# Patient Record
Sex: FEMALE | Race: WHITE | Employment: OTHER | URBAN - METROPOLITAN AREA
[De-identification: names, ages, dates, MRNs, and addresses within clinical notes are randomized per-mention and may not be internally consistent; named-entity substitution may affect disease eponyms.]

---

## 2017-03-10 ENCOUNTER — TELEPHONE (OUTPATIENT)
Dept: CARDIOLOGY CLINIC | Age: 67
End: 2017-03-10

## 2017-05-11 ENCOUNTER — OFFICE VISIT (OUTPATIENT)
Dept: CARDIOLOGY CLINIC | Age: 67
End: 2017-05-11

## 2017-05-11 VITALS
WEIGHT: 165 LBS | HEIGHT: 61 IN | BODY MASS INDEX: 31.15 KG/M2 | SYSTOLIC BLOOD PRESSURE: 116 MMHG | DIASTOLIC BLOOD PRESSURE: 82 MMHG | HEART RATE: 59 BPM

## 2017-05-11 DIAGNOSIS — I48.0 PAF (PAROXYSMAL ATRIAL FIBRILLATION) (HCC): Primary | ICD-10-CM

## 2017-05-11 DIAGNOSIS — I48.0 PAROXYSMAL ATRIAL FIBRILLATION (HCC): Primary | ICD-10-CM

## 2017-05-11 DIAGNOSIS — R00.2 PALPITATION: ICD-10-CM

## 2017-05-11 PROCEDURE — 99214 OFFICE O/P EST MOD 30 MIN: CPT | Performed by: INTERNAL MEDICINE

## 2017-05-11 PROCEDURE — 93000 ELECTROCARDIOGRAM COMPLETE: CPT | Performed by: INTERNAL MEDICINE

## 2017-06-26 ENCOUNTER — TELEPHONE (OUTPATIENT)
Dept: CARDIOLOGY CLINIC | Age: 67
End: 2017-06-26

## 2017-06-26 RX ORDER — HYDROCHLOROTHIAZIDE 25 MG/1
TABLET ORAL
Qty: 90 TABLET | Refills: 2 | Status: SHIPPED | OUTPATIENT
Start: 2017-06-26 | End: 2017-07-25 | Stop reason: SDUPTHER

## 2017-07-25 RX ORDER — PROPAFENONE HYDROCHLORIDE 225 MG/1
CAPSULE, EXTENDED RELEASE ORAL
Qty: 180 CAPSULE | Refills: 2 | Status: SHIPPED | OUTPATIENT
Start: 2017-07-25 | End: 2018-05-23 | Stop reason: SDUPTHER

## 2017-07-25 RX ORDER — HYDROCHLOROTHIAZIDE 25 MG/1
TABLET ORAL
Qty: 90 TABLET | Refills: 2 | Status: SHIPPED | OUTPATIENT
Start: 2017-07-25 | End: 2018-05-23 | Stop reason: SDUPTHER

## 2018-03-05 ENCOUNTER — OFFICE VISIT (OUTPATIENT)
Dept: CARDIOLOGY CLINIC | Age: 68
End: 2018-03-05

## 2018-03-05 VITALS
SYSTOLIC BLOOD PRESSURE: 138 MMHG | WEIGHT: 164 LBS | BODY MASS INDEX: 30.96 KG/M2 | DIASTOLIC BLOOD PRESSURE: 82 MMHG | HEART RATE: 54 BPM | HEIGHT: 61 IN

## 2018-03-05 DIAGNOSIS — I48.0 PAROXYSMAL ATRIAL FIBRILLATION (HCC): Primary | ICD-10-CM

## 2018-03-05 DIAGNOSIS — I34.0 NON-RHEUMATIC MITRAL REGURGITATION: ICD-10-CM

## 2018-03-05 DIAGNOSIS — E78.2 MIXED HYPERLIPIDEMIA: ICD-10-CM

## 2018-03-05 DIAGNOSIS — I10 ESSENTIAL HYPERTENSION: ICD-10-CM

## 2018-03-05 PROCEDURE — G8427 DOCREV CUR MEDS BY ELIG CLIN: HCPCS | Performed by: NURSE PRACTITIONER

## 2018-03-05 PROCEDURE — G8484 FLU IMMUNIZE NO ADMIN: HCPCS | Performed by: NURSE PRACTITIONER

## 2018-03-05 PROCEDURE — 1123F ACP DISCUSS/DSCN MKR DOCD: CPT | Performed by: NURSE PRACTITIONER

## 2018-03-05 PROCEDURE — 3017F COLORECTAL CA SCREEN DOC REV: CPT | Performed by: NURSE PRACTITIONER

## 2018-03-05 PROCEDURE — 1090F PRES/ABSN URINE INCON ASSESS: CPT | Performed by: NURSE PRACTITIONER

## 2018-03-05 PROCEDURE — 1036F TOBACCO NON-USER: CPT | Performed by: NURSE PRACTITIONER

## 2018-03-05 PROCEDURE — 93000 ELECTROCARDIOGRAM COMPLETE: CPT | Performed by: NURSE PRACTITIONER

## 2018-03-05 PROCEDURE — G8417 CALC BMI ABV UP PARAM F/U: HCPCS | Performed by: NURSE PRACTITIONER

## 2018-03-05 PROCEDURE — G8400 PT W/DXA NO RESULTS DOC: HCPCS | Performed by: NURSE PRACTITIONER

## 2018-03-05 PROCEDURE — G8598 ASA/ANTIPLAT THER USED: HCPCS | Performed by: NURSE PRACTITIONER

## 2018-03-05 PROCEDURE — 99214 OFFICE O/P EST MOD 30 MIN: CPT | Performed by: NURSE PRACTITIONER

## 2018-03-05 PROCEDURE — 3014F SCREEN MAMMO DOC REV: CPT | Performed by: NURSE PRACTITIONER

## 2018-03-05 PROCEDURE — 4040F PNEUMOC VAC/ADMIN/RCVD: CPT | Performed by: NURSE PRACTITIONER

## 2018-03-05 RX ORDER — NITROGLYCERIN 0.4 MG/1
0.4 TABLET SUBLINGUAL EVERY 5 MIN PRN
Qty: 25 TABLET | Refills: 1 | Status: SHIPPED | OUTPATIENT
Start: 2018-03-05

## 2018-03-05 NOTE — LETTER
 levothyroxine (SYNTHROID) 150 MCG tablet 150 mcg       zolpidem (AMBIEN) 5 MG tablet Take 5 mg by mouth nightly as needed for Sleep (3 X weekly)       Omega-3 Fatty Acids (OMEGA 3 PO) Take 1,000 mg by mouth 3 times daily            Objective:   PHYSICAL EXAM:    Vitals:    03/05/18 1100 03/05/18 1122   BP: 130/88 138/82   Pulse: 54    Weight: 164 lb (74.4 kg)    Height: 5' 1\" (1.549 m)          VITALS:  /88   Pulse 54   Ht 5' 1\" (1.549 m)   Wt 164 lb (74.4 kg)   BMI 30.99 kg/m²    CONSTITUTIONAL: Cooperative, no apparent distress, and appears well nourished / developed  NEUROLOGIC:  Awake and orientated to person, place and time. PSYCH: Calm affect. SKIN: Warm and dry. HEENT: Sclera non-icteric, normocephalic, neck supple, no elevation of JVP, normal carotid pulses with no bruits and thyroid normal size. LUNGS:  No increased work of breathing and clear to auscultation, no crackles or wheezing  CARDIOVASCULAR:  Regular rate 60 and rhythm with no murmurs, gallops, rubs, or abnormal heart sounds, normal PMI. The apical impulses not displaced  JVP less than 8 cm H2O  Heart tones are crisp and normal  Cervical veins are not engorged  The carotid upstroke is normal in amplitude and contour without delay or bruit  JVP is not elevated  ABDOMEN:  Normal bowel sounds, non-distended and non-tender to palpation  EXT: No edema, no calf tenderness. Pulses are present bilaterally. DATA:    Lab Results   Component Value Date    ALT 22 07/27/2017    AST 21 07/27/2017    ALKPHOS 70 07/27/2017    BILITOT 0.9 07/27/2017     Lab Results   Component Value Date    CREATININE 0.8 07/27/2017    BUN 28 (H) 07/27/2017     07/27/2017    K 3.8 07/27/2017    CL 98 (L) 07/27/2017    CO2 28 07/27/2017       Assessment:     1. Atrial fibrillation   ~has no recurrences : hx myocardial bridging.  Had CP when in AF RVR   ~Eliquis / Rythmol   * Unable top tolerate BB or CCB due to bradycardia 2. Essential hypertension, benign   ~controlled       3. Mitral regurgitation   ~mild-mod on echo from '13   4. Mixed hyperlipidemia   ~followed by PCP  *July '14:  trig 42 HDL 89 LDL 95         I had the opportunity to review the clinical symptoms and presentation of Saw Hirsch. Plan:     1. EKG: sinus bradycardia 54  2. Stress echo to reassess her MR & LVEF  3. F/U in six months    Overall the patient is stable from CV standpoint    Thank you for allowing to us to participate in the care of Saw Hirsch. If you have questions, please do not hesitate to call me. I look forward to following Bi Jimenez along with you.     Sincerely,        Bautista De León NP

## 2018-03-05 NOTE — PROGRESS NOTES
capsule 11    levothyroxine (SYNTHROID) 150 MCG tablet 150 mcg       zolpidem (AMBIEN) 5 MG tablet Take 5 mg by mouth nightly as needed for Sleep (3 X weekly)       Omega-3 Fatty Acids (OMEGA 3 PO) Take 1,000 mg by mouth 3 times daily        No current facility-administered medications for this visit. REVIEW OF SYSTEMS:    CONSTITUTIONAL: No major weight gain or loss, fatigue, weakness, night sweats or fever. HEENT: No new vision difficulties or ringing in the ears. RESPIRATORY: No new SOB, PND, orthopnea or cough. CARDIOVASCULAR: See HPI  GI: No nausea, vomiting, diarrhea, constipation, abdominal pain or changes in bowel habits. : No urinary frequency, urgency, incontinence hematuria or dysuria. SKIN: No cyanosis or skin lesions. MUSCULOSKELETAL: No new muscle or joint pain. NEUROLOGICAL: No syncope or TIA-like symptoms. PSYCHIATRIC: No anxiety, pain, insomnia or depression    Objective:   PHYSICAL EXAM:    Vitals:    03/05/18 1100 03/05/18 1122   BP: 130/88 138/82   Pulse: 54    Weight: 164 lb (74.4 kg)    Height: 5' 1\" (1.549 m)          VITALS:  /88   Pulse 54   Ht 5' 1\" (1.549 m)   Wt 164 lb (74.4 kg)   BMI 30.99 kg/m²   CONSTITUTIONAL: Cooperative, no apparent distress, and appears well nourished / developed  NEUROLOGIC:  Awake and orientated to person, place and time. PSYCH: Calm affect. SKIN: Warm and dry. HEENT: Sclera non-icteric, normocephalic, neck supple, no elevation of JVP, normal carotid pulses with no bruits and thyroid normal size. LUNGS:  No increased work of breathing and clear to auscultation, no crackles or wheezing  CARDIOVASCULAR:  Regular rate 60 and rhythm with no murmurs, gallops, rubs, or abnormal heart sounds, normal PMI. The apical impulses not displaced  JVP less than 8 cm H2O  Heart tones are crisp and normal  Cervical veins are not engorged  The carotid upstroke is normal in amplitude and contour without delay or bruit  JVP is not elevated  ABDOMEN:

## 2018-03-05 NOTE — COMMUNICATION BODY
VITALS:  /88   Pulse 54   Ht 5' 1\" (1.549 m)   Wt 164 lb (74.4 kg)   BMI 30.99 kg/m²    CONSTITUTIONAL: Cooperative, no apparent distress, and appears well nourished / developed  NEUROLOGIC:  Awake and orientated to person, place and time. PSYCH: Calm affect. SKIN: Warm and dry. HEENT: Sclera non-icteric, normocephalic, neck supple, no elevation of JVP, normal carotid pulses with no bruits and thyroid normal size. LUNGS:  No increased work of breathing and clear to auscultation, no crackles or wheezing  CARDIOVASCULAR:  Regular rate 60 and rhythm with no murmurs, gallops, rubs, or abnormal heart sounds, normal PMI. The apical impulses not displaced  JVP less than 8 cm H2O  Heart tones are crisp and normal  Cervical veins are not engorged  The carotid upstroke is normal in amplitude and contour without delay or bruit  JVP is not elevated  ABDOMEN:  Normal bowel sounds, non-distended and non-tender to palpation  EXT: No edema, no calf tenderness. Pulses are present bilaterally. DATA:    Lab Results   Component Value Date    ALT 22 07/27/2017    AST 21 07/27/2017    ALKPHOS 70 07/27/2017    BILITOT 0.9 07/27/2017     Lab Results   Component Value Date    CREATININE 0.8 07/27/2017    BUN 28 (H) 07/27/2017     07/27/2017    K 3.8 07/27/2017    CL 98 (L) 07/27/2017    CO2 28 07/27/2017       Assessment:     1. Atrial fibrillation   ~has no recurrences : hx myocardial bridging. Had CP when in AF RVR   ~Eliquis / Rythmol   * Unable top tolerate BB or CCB due to bradycardia      2. Essential hypertension, benign   ~controlled       3. Mitral regurgitation   ~mild-mod on echo from '13   4. Mixed hyperlipidemia   ~followed by PCP  *July '14:  trig 42 HDL 89 LDL 95         I had the opportunity to review the clinical symptoms and presentation of Mike Guzman. Plan:     1. EKG: sinus bradycardia 54  2.  Stress echo to reassess her MR & LVEF  3. F/U in six months    Overall the patient is stable from CV standpoint    Thank you for allowing to us to participate in the care of Renata Cisneros.

## 2018-04-03 ENCOUNTER — TELEPHONE (OUTPATIENT)
Dept: CARDIOLOGY CLINIC | Age: 68
End: 2018-04-03

## 2018-04-03 ENCOUNTER — HOSPITAL ENCOUNTER (OUTPATIENT)
Dept: CARDIOLOGY | Facility: CLINIC | Age: 68
Discharge: HOME | End: 2018-04-03
Attending: NURSE PRACTITIONER | Admitting: NURSE PRACTITIONER

## 2018-04-03 DIAGNOSIS — I48.0 PAROXYSMAL ATRIAL FIBRILLATION (HCC): ICD-10-CM

## 2018-05-23 DIAGNOSIS — R00.2 PALPITATION: Primary | ICD-10-CM

## 2018-05-23 DIAGNOSIS — I48.0 PAROXYSMAL ATRIAL FIBRILLATION (HCC): ICD-10-CM

## 2018-05-29 RX ORDER — HYDROCHLOROTHIAZIDE 25 MG/1
TABLET ORAL
Qty: 90 TABLET | Refills: 0 | Status: SHIPPED | OUTPATIENT
Start: 2018-05-29 | End: 2018-08-07 | Stop reason: SDUPTHER

## 2018-05-29 RX ORDER — PROPAFENONE HYDROCHLORIDE 225 MG/1
CAPSULE, EXTENDED RELEASE ORAL
Qty: 180 CAPSULE | Refills: 0 | Status: SHIPPED | OUTPATIENT
Start: 2018-05-29 | End: 2018-08-09 | Stop reason: SDUPTHER

## 2018-08-07 DIAGNOSIS — I48.0 PAROXYSMAL ATRIAL FIBRILLATION (HCC): ICD-10-CM

## 2018-08-07 DIAGNOSIS — R00.2 PALPITATION: ICD-10-CM

## 2018-08-09 DIAGNOSIS — I48.0 PAROXYSMAL ATRIAL FIBRILLATION (HCC): Primary | ICD-10-CM

## 2018-08-09 RX ORDER — HYDROCHLOROTHIAZIDE 25 MG/1
TABLET ORAL
Qty: 90 TABLET | Refills: 0 | Status: SHIPPED | OUTPATIENT
Start: 2018-08-09 | End: 2019-01-21 | Stop reason: SDUPTHER

## 2018-08-15 DIAGNOSIS — I48.0 PAROXYSMAL ATRIAL FIBRILLATION (HCC): ICD-10-CM

## 2018-08-15 DIAGNOSIS — R00.2 PALPITATION: ICD-10-CM

## 2018-08-20 RX ORDER — PROPAFENONE HYDROCHLORIDE 225 MG/1
CAPSULE, EXTENDED RELEASE ORAL
Qty: 180 CAPSULE | Refills: 1 | Status: SHIPPED | OUTPATIENT
Start: 2018-08-20 | End: 2018-11-26 | Stop reason: SDUPTHER

## 2018-08-22 RX ORDER — APIXABAN 5 MG/1
TABLET, FILM COATED ORAL
Qty: 180 TABLET | Refills: 11 | OUTPATIENT
Start: 2018-08-22

## 2018-08-23 ENCOUNTER — HOSPITAL ENCOUNTER (OUTPATIENT)
Age: 68
Discharge: HOME | End: 2018-08-23
Payer: MEDICARE

## 2018-08-23 DIAGNOSIS — I48.0 PAROXYSMAL ATRIAL FIBRILLATION (HCC): ICD-10-CM

## 2018-08-23 LAB
ANION GAP SERPL CALCULATED.3IONS-SCNC: 15 MMOL/L (ref 3–16)
BASOPHILS ABSOLUTE: 0 K/UL (ref 0–0.2)
BASOPHILS RELATIVE PERCENT: 0.9 %
BUN BLDV-MCNC: 17 MG/DL (ref 7–20)
CALCIUM SERPL-MCNC: 9.7 MG/DL (ref 8.3–10.6)
CHLORIDE BLD-SCNC: 100 MMOL/L (ref 99–110)
CO2: 27 MMOL/L (ref 21–32)
CREAT SERPL-MCNC: 0.8 MG/DL (ref 0.6–1.2)
EOSINOPHILS ABSOLUTE: 0.1 K/UL (ref 0–0.6)
EOSINOPHILS RELATIVE PERCENT: 2.3 %
GFR AFRICAN AMERICAN: >60
GFR NON-AFRICAN AMERICAN: >60
GLUCOSE BLD-MCNC: 119 MG/DL (ref 70–99)
HCT VFR BLD CALC: 43.7 % (ref 36–48)
HEMOGLOBIN: 14.7 G/DL (ref 12–16)
LYMPHOCYTES ABSOLUTE: 0.9 K/UL (ref 1–5.1)
LYMPHOCYTES RELATIVE PERCENT: 19.1 %
MCH RBC QN AUTO: 29 PG (ref 26–34)
MCHC RBC AUTO-ENTMCNC: 33.6 G/DL (ref 31–36)
MCV RBC AUTO: 86.3 FL (ref 80–100)
MONOCYTES ABSOLUTE: 0.5 K/UL (ref 0–1.3)
MONOCYTES RELATIVE PERCENT: 9.4 %
NEUTROPHILS ABSOLUTE: 3.4 K/UL (ref 1.7–7.7)
NEUTROPHILS RELATIVE PERCENT: 68.3 %
PDW BLD-RTO: 14.5 % (ref 12.4–15.4)
PLATELET # BLD: 232 K/UL (ref 135–450)
PMV BLD AUTO: 9.7 FL (ref 5–10.5)
POTASSIUM SERPL-SCNC: 4.5 MMOL/L (ref 3.5–5.1)
RBC # BLD: 5.06 M/UL (ref 4–5.2)
SODIUM BLD-SCNC: 142 MMOL/L (ref 136–145)
WBC # BLD: 5 K/UL (ref 4–11)

## 2018-08-23 PROCEDURE — 80048 BASIC METABOLIC PNL TOTAL CA: CPT

## 2018-08-23 PROCEDURE — 36415 COLL VENOUS BLD VENIPUNCTURE: CPT

## 2018-08-23 PROCEDURE — 85025 COMPLETE CBC W/AUTO DIFF WBC: CPT

## 2018-08-27 NOTE — TELEPHONE ENCOUNTER
Pt had labs done 8/24 to get refill on apixaban (ELIQUIS) 5 MG TABS tablet     Please send to Pharmacy:  [address] [place] [address], [facility] [phone] - F [phone]

## 2018-08-27 NOTE — TELEPHONE ENCOUNTER
Last office visit NPTS 3/5/18  Upcoming visit 9/10/18  Cbc, bmp 8/23/18  Refill denied 8/9/18 due to needing cbc

## 2018-09-10 ENCOUNTER — OFFICE VISIT (OUTPATIENT)
Dept: CARDIOLOGY CLINIC | Age: 68
End: 2018-09-10

## 2018-09-10 VITALS
HEIGHT: 61 IN | SYSTOLIC BLOOD PRESSURE: 114 MMHG | OXYGEN SATURATION: 95 % | DIASTOLIC BLOOD PRESSURE: 82 MMHG | WEIGHT: 165 LBS | BODY MASS INDEX: 31.15 KG/M2 | HEART RATE: 61 BPM

## 2018-09-10 DIAGNOSIS — I10 ESSENTIAL HYPERTENSION: ICD-10-CM

## 2018-09-10 DIAGNOSIS — I48.0 PAROXYSMAL ATRIAL FIBRILLATION (HCC): Primary | ICD-10-CM

## 2018-09-10 DIAGNOSIS — I34.0 NON-RHEUMATIC MITRAL REGURGITATION: ICD-10-CM

## 2018-09-10 PROCEDURE — G8598 ASA/ANTIPLAT THER USED: HCPCS | Performed by: NURSE PRACTITIONER

## 2018-09-10 PROCEDURE — 99214 OFFICE O/P EST MOD 30 MIN: CPT | Performed by: NURSE PRACTITIONER

## 2018-09-10 PROCEDURE — G8400 PT W/DXA NO RESULTS DOC: HCPCS | Performed by: NURSE PRACTITIONER

## 2018-09-10 PROCEDURE — 93000 ELECTROCARDIOGRAM COMPLETE: CPT | Performed by: NURSE PRACTITIONER

## 2018-09-10 PROCEDURE — 1036F TOBACCO NON-USER: CPT | Performed by: NURSE PRACTITIONER

## 2018-09-10 PROCEDURE — 1123F ACP DISCUSS/DSCN MKR DOCD: CPT | Performed by: NURSE PRACTITIONER

## 2018-09-10 PROCEDURE — 1090F PRES/ABSN URINE INCON ASSESS: CPT | Performed by: NURSE PRACTITIONER

## 2018-09-10 PROCEDURE — 4040F PNEUMOC VAC/ADMIN/RCVD: CPT | Performed by: NURSE PRACTITIONER

## 2018-09-10 PROCEDURE — 1101F PT FALLS ASSESS-DOCD LE1/YR: CPT | Performed by: NURSE PRACTITIONER

## 2018-09-10 PROCEDURE — 3017F COLORECTAL CA SCREEN DOC REV: CPT | Performed by: NURSE PRACTITIONER

## 2018-09-10 PROCEDURE — G8417 CALC BMI ABV UP PARAM F/U: HCPCS | Performed by: NURSE PRACTITIONER

## 2018-09-10 PROCEDURE — G8427 DOCREV CUR MEDS BY ELIG CLIN: HCPCS | Performed by: NURSE PRACTITIONER

## 2018-09-10 NOTE — COMMUNICATION BODY
[address]     Outpatient Follow Up Note    Nash Castillo is 74 y.o.  who presents today with a history of mitral regurg, HTN, hyperlipidemia, myocardial bridging and PAF. CHIEF COMPLAINT / HPI:  Follow Up secondary to myocardial bridging and PAF    Subjective:     She denies significant chest pain. There is no SOB/ZULETA. The patient denies orthopnea/PND. The patient does not have swelling. The patients weight is stable . The patient is not experiencing palpitations or dizziness. PAF: symptoms of heart feeling like it was beating out of her chest (20 yrs ago). These symptoms  show no change since the  last  OV. With regard to medication therapy the patient has been compliant with prescribed regimen. They have tolerated therapy to date.        Current Outpatient Prescriptions   Medication Sig Dispense Refill    apixaban (ELIQUIS) 5 MG TABS tablet TAKE ONE TABLET BY MOUTH TWICE A  tablet 1    propafenone (RYTHMOL SR) 225 MG extended release capsule TAKE 1 CAPSULE EVERY 12 HOURS 180 capsule 1    hydrochlorothiazide (HYDRODIURIL) 25 MG tablet TAKE 1 TABLET EVERY DAY 90 tablet 0    lovastatin (MEVACOR) 40 MG tablet Take 1 tablet by mouth nightly 30 tablet 0    potassium chloride (MICRO-K) 10 MEQ CR capsule Take 2 capsules by mouth daily (Patient taking differently: Take 10 mEq by mouth 2 times daily ) 30 capsule 11    levothyroxine (SYNTHROID) 150 MCG tablet 150 mcg Daily       zolpidem (AMBIEN) 5 MG tablet Take 5 mg by mouth nightly as needed for Sleep (3 X weekly)       Omega-3 Fatty Acids (OMEGA 3 PO) Take 1,200 mg by mouth 3 times daily       nitroGLYCERIN (NITROSTAT) 0.4 MG SL tablet Place 1 tablet under the tongue every 5 minutes as needed for Chest pain 25 tablet 1       Objective:   PHYSICAL EXAM:    Vitals:    09/10/18 1131 09/10/18 1150   BP: 118/80 114/82   Pulse: 61    SpO2: 95%    Weight: 165 lb (74.8 kg)    Height: 5' 1\" (1.549 m)          VITALS:  /80   Pulse

## 2018-09-10 NOTE — PROGRESS NOTES
[facility]     Outpatient Follow Up Note    Subha Citizen is 74 y.o.  who presents today with a history of mitral regurg, HTN, hyperlipidemia, myocardial bridging and PAF. CHIEF COMPLAINT / HPI:  Follow Up secondary to myocardial bridging and PAF    Subjective:     She denies significant chest pain. There is no SOB/ZULETA. The patient denies orthopnea/PND. The patient does not have swelling. The patients weight is stable . The patient is not experiencing palpitations or dizziness. PAF: symptoms of heart feeling like it was beating out of her chest (20 yrs ago). These symptoms  show no change since the  last  OV. With regard to medication therapy the patient has been compliant with prescribed regimen. They have tolerated therapy to date.      Past Medical History:   Diagnosis Date    A-fib [facility])     one time \"years ago\", due to hypothyroidism     Hyperlipidemia     Hypothyroid      Social History:    History   Smoking Status    Never Smoker   Smokeless Tobacco    Never Used     Current Medications:  Current Outpatient Prescriptions   Medication Sig Dispense Refill    apixaban (ELIQUIS) 5 MG TABS tablet TAKE ONE TABLET BY MOUTH TWICE A  tablet 1    propafenone (RYTHMOL SR) 225 MG extended release capsule TAKE 1 CAPSULE EVERY 12 HOURS 180 capsule 1    hydrochlorothiazide (HYDRODIURIL) 25 MG tablet TAKE 1 TABLET EVERY DAY 90 tablet 0    lovastatin (MEVACOR) 40 MG tablet Take 1 tablet by mouth nightly 30 tablet 0    potassium chloride (MICRO-K) 10 MEQ CR capsule Take 2 capsules by mouth daily (Patient taking differently: Take 10 mEq by mouth 2 times daily ) 30 capsule 11    levothyroxine (SYNTHROID) 150 MCG tablet 150 mcg Daily       zolpidem (AMBIEN) 5 MG tablet Take 5 mg by mouth nightly as needed for Sleep (3 X weekly)       Omega-3 Fatty Acids (OMEGA 3 PO) Take 1,200 mg by mouth 3 times daily       nitroGLYCERIN (NITROSTAT) 0.4 MG SL tablet Place 1 tablet under the tongue Component Value Date    CREATININE 0.8 08/23/2018    BUN 17 08/23/2018     08/23/2018    K 4.5 08/23/2018     08/23/2018    CO2 27 08/23/2018     LABS: 5/10/18:   TSH 0.79    trig 84 HDL 69 LDL 90    Radiology Review:  Pertinent images / reports were reviewed as a part of this visit and reveals the following:    [id number] Score for Atrial Fibrillation Stroke Risk   Risk   Factors  Component Value   C CHF No 0   H HTN Yes 1   A2 Age >= 76 No,  (74 y.o.) 0   D DM No 0   S2 Prior Stroke/TIA No 0   V Vascular Disease No 0   A Age 74-69 Yes,  (68 y.o.) 1   Sc Sex female 1    [id number]  Score  3   Score last updated 9/10/18 12:12 PM       Echo: 9/3/13:  Summary  Normal left ventricle size, wall thickness and systolic function with an  estimated ejection fraction of 55%. No regional wall motion abnormalities  are seen. Mitral valve is structurally normal.  Mild-moderate mitral regurgitation is present. There is mild tricuspid regurgitation with RVSP estimated at 35 mmHg. The left atrium is mildly dilated, 2.8     Angiogram: 9/3/13:  Mild bridging mid LAD but o/w normal coronary arteries,   Normal LVEF     Stress echo: April '18:  Summary   Normal exercise stress ECHO.      Rest      ECG   Normal sinus rhythm; nonspecific ST changes      Standing HR:66 bpmStanding BP:114/95 mmHg      Stress      Stress Type: Exercise      Stress Protocol: Chetan      Rest HR: 64 bpm                           HR Response: Normal   Rest BP: 118/97 mmHg                      BP Response: Normal   Stress Peak HR: 144 bpm                   HR BP Product: [id number]   Stress Peak BP: 142/73 mmHg               Max Exercise: 10 METS   Predicted HR: 155 bpm   % of predicted HR: 93   Test Duration: 7 min   Reason for Termination: Target heart rate      Results      Echo (rest): Normal (LVEF >50%)      Echo (stress):  Hyperkinetic (LVEF >70%)      Echo   Baseline resting echocardiogram shows normal global LV systolic function   with an ejection fraction of 55% and uniform myocardial segmental wall   motion. Following stress there was uniform augmentation of all myocardial   segments with appropriate hyperdynamic LV systolic response to stress.      ECG   <0.5mm ST segment depression.   Normal exercise stress EKG portion.      Arrhythmias   No evidence for significant arrhythmia.   Baseline artifact.      Symptoms   There was stress induced shortness of breath.   Symptoms resolved with rest.           Echo Summary   Normal left ventricle systolic function with an estimated EF of 55%.   No regional wall motion abnormalities are seen.   Grade I diastolic dysfunction with normal filling pressure.   Mild mitral and tricuspid regurgitation.   Systolic pulmonary artery pressure (SPAP) is normal and estimated at 23mmHg   (RA pressure 3mmHg). Assessment:     1. Atrial fibrillation   ~regular to auscultation  ~denies recurrence  ~Eliquis / Rythmol   ~LA 3.1 cm ; TSH WNL  ~CHADs 3  ~ hx myocardial bridging. Had CP when in AF RVR   * Unable top tolerate BB or CCB due to bradycardia      2. Essential hypertension, benign   ~controlled       3. Mitral regurgitation   ~noted as mild by echo from April '18  ~mild-mod on echo from '13   4. Mixed hyperlipidemia   ~well controlled on mevacor  ~followed by PCP         I had the opportunity to review the clinical symptoms and presentation of Erasmo Fine. Plan:     1. EKG: sinus bradycardia 58  2. F/U in six months    Overall the patient is stable from CV standpoint    I have addresed the patient's cardiac risk factors and adjusted pharmacologic treatment as needed. In addition, I have reinforced the need for patient directed risk factor modification. Further evaluation will be based upon the patient's clinical course and testing results. All questions and concerns were addressed to the patient.  Alternatives to my treatment were discussed.      ~given GoodRx information d/t cost of

## 2018-09-10 NOTE — LETTER
S2 Prior Stroke/TIA No 0   V Vascular Disease No 0   A Age 74-69 Yes,  (68 y.o.) 1   Sc Sex female 1    [id number]  Score  3   Score last updated 9/10/18 12:12 PM      Stress echo: April '18:  Summary   Normal exercise stress ECHO.      Rest      ECG   Normal sinus rhythm; nonspecific ST changes      Standing HR:66 bpmStanding BP:114/95 mmHg      Stress      Stress Type: Exercise      Stress Protocol: Chetan      Rest HR: 64 bpm                           HR Response: Normal   Rest BP: 118/97 mmHg                      BP Response: Normal   Stress Peak HR: 144 bpm                   HR BP Product: [id number]   Stress Peak BP: 142/73 mmHg               Max Exercise: 10 METS   Predicted HR: 155 bpm   % of predicted HR: 93   Test Duration: 7 min   Reason for Termination: Target heart rate      Results      Echo (rest): Normal (LVEF >50%)      Echo (stress): Hyperkinetic (LVEF >70%)      Echo   Baseline resting echocardiogram shows normal global LV systolic function   with an ejection fraction of 55% and uniform myocardial segmental wall   motion. Following stress there was uniform augmentation of all myocardial   segments with appropriate hyperdynamic LV systolic response to stress.      ECG   <0.5mm ST segment depression.   Normal exercise stress EKG portion.      Arrhythmias   No evidence for significant arrhythmia.   Baseline artifact.      Symptoms   There was stress induced shortness of breath.   Symptoms resolved with rest.           Echo Summary   Normal left ventricle systolic function with an estimated EF of 55%.   No regional wall motion abnormalities are seen.   Grade I diastolic dysfunction with normal filling pressure.   Mild mitral and tricuspid regurgitation.   Systolic pulmonary artery pressure (SPAP) is normal and estimated at 23mmHg   (RA pressure 3mmHg). Assessment:     1.  Atrial fibrillation   ~regular to auscultation  ~denies recurrence  ~Eliquis / Rythmol   ~LA 3.1 cm ; TSH WNL  ~CHADs 3

## 2018-11-26 ENCOUNTER — TELEPHONE (OUTPATIENT)
Dept: CARDIOLOGY CLINIC | Age: 68
End: 2018-11-26

## 2018-11-26 DIAGNOSIS — R00.2 PALPITATION: ICD-10-CM

## 2018-11-26 DIAGNOSIS — I48.0 PAROXYSMAL ATRIAL FIBRILLATION (HCC): ICD-10-CM

## 2018-11-26 RX ORDER — PROPAFENONE HYDROCHLORIDE 225 MG/1
CAPSULE, EXTENDED RELEASE ORAL
Qty: 180 CAPSULE | Refills: 1 | Status: SHIPPED | OUTPATIENT
Start: 2018-11-26 | End: 2019-04-15 | Stop reason: SDUPTHER

## 2018-11-26 NOTE — TELEPHONE ENCOUNTER
Pt states she needs a refill of her propafenone (RYTHMOL SR) 225 MG extended release capsule sent to [facility] mail order pharmacy.

## 2018-12-03 ENCOUNTER — TELEPHONE (OUTPATIENT)
Dept: CARDIOLOGY CLINIC | Age: 68
End: 2018-12-03

## 2019-01-21 DIAGNOSIS — I48.0 PAROXYSMAL ATRIAL FIBRILLATION (HCC): ICD-10-CM

## 2019-01-21 DIAGNOSIS — R00.2 PALPITATION: ICD-10-CM

## 2019-01-22 RX ORDER — HYDROCHLOROTHIAZIDE 25 MG/1
TABLET ORAL
Qty: 90 TABLET | Refills: 0 | Status: SHIPPED | OUTPATIENT
Start: 2019-01-22 | End: 2019-03-25 | Stop reason: SDUPTHER

## 2019-03-04 ENCOUNTER — TELEPHONE (OUTPATIENT)
Dept: CARDIOLOGY CLINIC | Age: 69
End: 2019-03-04

## 2019-03-06 ENCOUNTER — TELEPHONE (OUTPATIENT)
Dept: CARDIOLOGY CLINIC | Age: 69
End: 2019-03-06

## 2019-03-08 RX ORDER — POTASSIUM CHLORIDE 750 MG/1
10 CAPSULE, EXTENDED RELEASE ORAL 2 TIMES DAILY
Qty: 60 CAPSULE | Refills: 0 | Status: SHIPPED | OUTPATIENT
Start: 2019-03-08 | End: 2019-04-01 | Stop reason: SDUPTHER

## 2019-03-18 ENCOUNTER — OFFICE VISIT (OUTPATIENT)
Dept: CARDIOLOGY CLINIC | Age: 69
End: 2019-03-18
Payer: MEDICARE

## 2019-03-18 VITALS
HEART RATE: 63 BPM | DIASTOLIC BLOOD PRESSURE: 80 MMHG | BODY MASS INDEX: 32.1 KG/M2 | HEIGHT: 61 IN | WEIGHT: 170 LBS | SYSTOLIC BLOOD PRESSURE: 120 MMHG | OXYGEN SATURATION: 98 %

## 2019-03-18 DIAGNOSIS — Q24.5 MYOCARDIAL BRIDGE: ICD-10-CM

## 2019-03-18 DIAGNOSIS — I48.0 PAF (PAROXYSMAL ATRIAL FIBRILLATION) (HCC): Primary | ICD-10-CM

## 2019-03-18 DIAGNOSIS — I10 ESSENTIAL HYPERTENSION: ICD-10-CM

## 2019-03-18 DIAGNOSIS — E78.2 MIXED HYPERLIPIDEMIA: ICD-10-CM

## 2019-03-18 PROCEDURE — 99214 OFFICE O/P EST MOD 30 MIN: CPT | Performed by: NURSE PRACTITIONER

## 2019-03-18 PROCEDURE — G8598 ASA/ANTIPLAT THER USED: HCPCS | Performed by: NURSE PRACTITIONER

## 2019-03-18 PROCEDURE — 1036F TOBACCO NON-USER: CPT | Performed by: NURSE PRACTITIONER

## 2019-03-18 PROCEDURE — G8417 CALC BMI ABV UP PARAM F/U: HCPCS | Performed by: NURSE PRACTITIONER

## 2019-03-18 PROCEDURE — 1101F PT FALLS ASSESS-DOCD LE1/YR: CPT | Performed by: NURSE PRACTITIONER

## 2019-03-18 PROCEDURE — G8484 FLU IMMUNIZE NO ADMIN: HCPCS | Performed by: NURSE PRACTITIONER

## 2019-03-18 PROCEDURE — 93000 ELECTROCARDIOGRAM COMPLETE: CPT | Performed by: NURSE PRACTITIONER

## 2019-03-18 PROCEDURE — 4040F PNEUMOC VAC/ADMIN/RCVD: CPT | Performed by: NURSE PRACTITIONER

## 2019-03-18 PROCEDURE — 1123F ACP DISCUSS/DSCN MKR DOCD: CPT | Performed by: NURSE PRACTITIONER

## 2019-03-18 PROCEDURE — G8400 PT W/DXA NO RESULTS DOC: HCPCS | Performed by: NURSE PRACTITIONER

## 2019-03-18 PROCEDURE — G8427 DOCREV CUR MEDS BY ELIG CLIN: HCPCS | Performed by: NURSE PRACTITIONER

## 2019-03-18 PROCEDURE — 1090F PRES/ABSN URINE INCON ASSESS: CPT | Performed by: NURSE PRACTITIONER

## 2019-03-18 PROCEDURE — 3017F COLORECTAL CA SCREEN DOC REV: CPT | Performed by: NURSE PRACTITIONER

## 2019-03-18 RX ORDER — POTASSIUM CHLORIDE 750 MG/1
10 CAPSULE, EXTENDED RELEASE ORAL 2 TIMES DAILY
Qty: 60 CAPSULE | Refills: 0 | Status: CANCELLED | OUTPATIENT
Start: 2019-03-18

## 2019-03-25 DIAGNOSIS — I48.0 PAROXYSMAL ATRIAL FIBRILLATION (HCC): ICD-10-CM

## 2019-03-25 DIAGNOSIS — R00.2 PALPITATION: ICD-10-CM

## 2019-03-28 RX ORDER — HYDROCHLOROTHIAZIDE 25 MG/1
TABLET ORAL
Qty: 90 TABLET | Refills: 3 | Status: SHIPPED | OUTPATIENT
Start: 2019-03-28 | End: 2020-01-08

## 2019-05-03 RX ORDER — POTASSIUM CHLORIDE 750 MG/1
CAPSULE, EXTENDED RELEASE ORAL
Qty: 180 CAPSULE | Refills: 1 | Status: SHIPPED | OUTPATIENT
Start: 2019-05-03 | End: 2019-09-28 | Stop reason: SDUPTHER

## 2019-06-17 DIAGNOSIS — I48.0 PAROXYSMAL ATRIAL FIBRILLATION (HCC): Primary | ICD-10-CM

## 2019-06-17 NOTE — TELEPHONE ENCOUNTER
Pt called and needs a refill on her Eliqius 5mg tabs. Last ov 3/08/19 W/TS. Pt 's last script was sent to the wrong pharmacy. Pt needs the script to be sent to [facility] ( In pt's chart). Pt states that she only has 25 pills left and takes 2 a day.  Please advise, thank you

## 2019-09-11 DIAGNOSIS — R00.2 PALPITATION: ICD-10-CM

## 2019-09-11 DIAGNOSIS — I48.0 PAROXYSMAL ATRIAL FIBRILLATION (HCC): ICD-10-CM

## 2019-09-15 RX ORDER — PROPAFENONE HYDROCHLORIDE 225 MG/1
CAPSULE, EXTENDED RELEASE ORAL
Qty: 180 CAPSULE | Refills: 1 | Status: SHIPPED | OUTPATIENT
Start: 2019-09-15 | End: 2020-03-23

## 2019-10-06 RX ORDER — POTASSIUM CHLORIDE 750 MG/1
CAPSULE, EXTENDED RELEASE ORAL
Qty: 180 CAPSULE | Refills: 1 | Status: SHIPPED | OUTPATIENT
Start: 2019-10-06 | End: 2020-03-23

## 2019-10-28 ENCOUNTER — OFFICE VISIT (OUTPATIENT)
Dept: CARDIOLOGY CLINIC | Age: 69
End: 2019-10-28
Payer: MEDICARE

## 2019-10-28 VITALS
SYSTOLIC BLOOD PRESSURE: 122 MMHG | BODY MASS INDEX: 31.11 KG/M2 | HEART RATE: 58 BPM | WEIGHT: 164.8 LBS | DIASTOLIC BLOOD PRESSURE: 78 MMHG | HEIGHT: 61 IN

## 2019-10-28 DIAGNOSIS — E78.2 MIXED HYPERLIPIDEMIA: ICD-10-CM

## 2019-10-28 DIAGNOSIS — I10 ESSENTIAL HYPERTENSION: ICD-10-CM

## 2019-10-28 DIAGNOSIS — Q24.5 MYOCARDIAL BRIDGE: ICD-10-CM

## 2019-10-28 DIAGNOSIS — I48.0 PAROXYSMAL ATRIAL FIBRILLATION (HCC): Primary | ICD-10-CM

## 2019-10-28 DIAGNOSIS — R00.1 SINUS BRADYCARDIA: ICD-10-CM

## 2019-10-28 PROCEDURE — 99213 OFFICE O/P EST LOW 20 MIN: CPT | Performed by: NURSE PRACTITIONER

## 2019-10-28 PROCEDURE — G8417 CALC BMI ABV UP PARAM F/U: HCPCS | Performed by: NURSE PRACTITIONER

## 2019-10-28 PROCEDURE — 1123F ACP DISCUSS/DSCN MKR DOCD: CPT | Performed by: NURSE PRACTITIONER

## 2019-10-28 PROCEDURE — 1036F TOBACCO NON-USER: CPT | Performed by: NURSE PRACTITIONER

## 2019-10-28 PROCEDURE — G8484 FLU IMMUNIZE NO ADMIN: HCPCS | Performed by: NURSE PRACTITIONER

## 2019-10-28 PROCEDURE — G8598 ASA/ANTIPLAT THER USED: HCPCS | Performed by: NURSE PRACTITIONER

## 2019-10-28 PROCEDURE — 3017F COLORECTAL CA SCREEN DOC REV: CPT | Performed by: NURSE PRACTITIONER

## 2019-10-28 PROCEDURE — G8400 PT W/DXA NO RESULTS DOC: HCPCS | Performed by: NURSE PRACTITIONER

## 2019-10-28 PROCEDURE — 4040F PNEUMOC VAC/ADMIN/RCVD: CPT | Performed by: NURSE PRACTITIONER

## 2019-10-28 PROCEDURE — G8427 DOCREV CUR MEDS BY ELIG CLIN: HCPCS | Performed by: NURSE PRACTITIONER

## 2019-10-28 PROCEDURE — 1090F PRES/ABSN URINE INCON ASSESS: CPT | Performed by: NURSE PRACTITIONER

## 2019-10-28 ASSESSMENT — ENCOUNTER SYMPTOMS
RESPIRATORY NEGATIVE: 1
GASTROINTESTINAL NEGATIVE: 1

## 2019-12-12 ENCOUNTER — HOSPITAL ENCOUNTER (EMERGENCY)
Age: 69
Discharge: HOME | End: 2019-12-12
Payer: MEDICARE

## 2019-12-12 ENCOUNTER — APPOINTMENT (OUTPATIENT)
Dept: GENERAL RADIOLOGY | Age: 69
End: 2019-12-12
Payer: MEDICARE

## 2019-12-12 VITALS
OXYGEN SATURATION: 98 % | HEART RATE: 85 BPM | BODY MASS INDEX: 30.23 KG/M2 | TEMPERATURE: 98.5 F | DIASTOLIC BLOOD PRESSURE: 92 MMHG | SYSTOLIC BLOOD PRESSURE: 135 MMHG | WEIGHT: 160 LBS | RESPIRATION RATE: 20 BRPM

## 2019-12-12 DIAGNOSIS — R06.02 SHORTNESS OF BREATH: ICD-10-CM

## 2019-12-12 DIAGNOSIS — R07.9 CHEST PAIN, UNSPECIFIED TYPE: Primary | ICD-10-CM

## 2019-12-12 LAB
A/G RATIO: 1.5 (ref 1.1–2.2)
ALBUMIN SERPL-MCNC: 4.7 G/DL (ref 3.4–5)
ALP BLD-CCNC: 74 U/L (ref 40–129)
ALT SERPL-CCNC: 18 U/L (ref 10–40)
ANION GAP SERPL CALCULATED.3IONS-SCNC: 18 MMOL/L (ref 3–16)
AST SERPL-CCNC: 20 U/L (ref 15–37)
BASE EXCESS VENOUS: 0.5 MMOL/L (ref -3–3)
BASOPHILS ABSOLUTE: 0 K/UL (ref 0–0.2)
BASOPHILS RELATIVE PERCENT: 0.6 %
BILIRUB SERPL-MCNC: 1.2 MG/DL (ref 0–1)
BILIRUBIN URINE: NEGATIVE
BLOOD, URINE: ABNORMAL
BUN BLDV-MCNC: 17 MG/DL (ref 7–20)
CALCIUM SERPL-MCNC: 9.8 MG/DL (ref 8.3–10.6)
CARBOXYHEMOGLOBIN: 1.5 % (ref 0–1.5)
CHLORIDE BLD-SCNC: 95 MMOL/L (ref 99–110)
CLARITY: CLEAR
CO2: 25 MMOL/L (ref 21–32)
COLOR: YELLOW
CREAT SERPL-MCNC: 0.8 MG/DL (ref 0.6–1.2)
EKG ATRIAL RATE: 67 BPM
EKG DIAGNOSIS: NORMAL
EKG P AXIS: 49 DEGREES
EKG P-R INTERVAL: 174 MS
EKG Q-T INTERVAL: 338 MS
EKG QRS DURATION: 96 MS
EKG QTC CALCULATION (BAZETT): 357 MS
EKG R AXIS: -51 DEGREES
EKG T AXIS: 91 DEGREES
EKG VENTRICULAR RATE: 67 BPM
EOSINOPHILS ABSOLUTE: 0.1 K/UL (ref 0–0.6)
EOSINOPHILS RELATIVE PERCENT: 1.5 %
EPITHELIAL CELLS, UA: ABNORMAL /HPF
GFR AFRICAN AMERICAN: >60
GFR NON-AFRICAN AMERICAN: >60
GLOBULIN: 3.1 G/DL
GLUCOSE BLD-MCNC: 131 MG/DL (ref 70–99)
GLUCOSE URINE: NEGATIVE MG/DL
HCO3 VENOUS: 26.1 MMOL/L (ref 23–29)
HCT VFR BLD CALC: 45.6 % (ref 36–48)
HEMOGLOBIN: 15.4 G/DL (ref 12–16)
KETONES, URINE: 15 MG/DL
LEUKOCYTE ESTERASE, URINE: NEGATIVE
LYMPHOCYTES ABSOLUTE: 0.3 K/UL (ref 1–5.1)
LYMPHOCYTES RELATIVE PERCENT: 5.4 %
MCH RBC QN AUTO: 29.3 PG (ref 26–34)
MCHC RBC AUTO-ENTMCNC: 33.8 G/DL (ref 31–36)
MCV RBC AUTO: 86.7 FL (ref 80–100)
METHEMOGLOBIN VENOUS: 0.6 %
MICROSCOPIC EXAMINATION: YES
MONOCYTES ABSOLUTE: 0.7 K/UL (ref 0–1.3)
MONOCYTES RELATIVE PERCENT: 14 %
MUCUS: ABNORMAL /LPF
NEUTROPHILS ABSOLUTE: 4.1 K/UL (ref 1.7–7.7)
NEUTROPHILS RELATIVE PERCENT: 78.5 %
NITRITE, URINE: NEGATIVE
O2 CONTENT, VEN: 14 VOL %
O2 SAT, VEN: 68 %
O2 THERAPY: NORMAL
PCO2, VEN: 45.3 MMHG (ref 40–50)
PDW BLD-RTO: 14.3 % (ref 12.4–15.4)
PH UA: 5.5 (ref 5–8)
PH VENOUS: 7.38 (ref 7.35–7.45)
PLATELET # BLD: 205 K/UL (ref 135–450)
PMV BLD AUTO: 9.1 FL (ref 5–10.5)
PO2, VEN: 35.3 MMHG (ref 25–40)
POTASSIUM SERPL-SCNC: 3.5 MMOL/L (ref 3.5–5.1)
PROTEIN UA: NEGATIVE MG/DL
RBC # BLD: 5.26 M/UL (ref 4–5.2)
RBC UA: ABNORMAL /HPF (ref 0–2)
SODIUM BLD-SCNC: 138 MMOL/L (ref 136–145)
SPECIFIC GRAVITY UA: >=1.03 (ref 1–1.03)
TCO2 CALC VENOUS: 28 MMOL/L
TOTAL PROTEIN: 7.8 G/DL (ref 6.4–8.2)
TROPONIN: <0.01 NG/ML
TROPONIN: <0.01 NG/ML
URINE REFLEX TO CULTURE: ABNORMAL
URINE TYPE: ABNORMAL
UROBILINOGEN, URINE: 0.2 E.U./DL
WBC # BLD: 5.3 K/UL (ref 4–11)
WBC UA: ABNORMAL /HPF (ref 0–5)

## 2019-12-12 PROCEDURE — 84484 ASSAY OF TROPONIN QUANT: CPT

## 2019-12-12 PROCEDURE — 93005 ELECTROCARDIOGRAM TRACING: CPT

## 2019-12-12 PROCEDURE — 85025 COMPLETE CBC W/AUTO DIFF WBC: CPT

## 2019-12-12 PROCEDURE — 71046 X-RAY EXAM CHEST 2 VIEWS: CPT

## 2019-12-12 PROCEDURE — 81001 URINALYSIS AUTO W/SCOPE: CPT

## 2019-12-12 PROCEDURE — 80053 COMPREHEN METABOLIC PANEL: CPT

## 2019-12-12 PROCEDURE — 99285 EMERGENCY DEPT VISIT HI MDM: CPT

## 2019-12-12 PROCEDURE — 82803 BLOOD GASES ANY COMBINATION: CPT

## 2019-12-12 ASSESSMENT — PAIN SCALES - GENERAL: PAINLEVEL_OUTOF10: 5

## 2020-01-08 RX ORDER — HYDROCHLOROTHIAZIDE 25 MG/1
TABLET ORAL
Qty: 90 TABLET | Refills: 3 | Status: SHIPPED | OUTPATIENT
Start: 2020-01-08 | End: 2020-12-18

## 2020-03-23 RX ORDER — PROPAFENONE HYDROCHLORIDE 225 MG/1
CAPSULE, EXTENDED RELEASE ORAL
Qty: 180 CAPSULE | Refills: 1 | Status: SHIPPED | OUTPATIENT
Start: 2020-03-23 | End: 2020-10-05

## 2020-03-23 RX ORDER — POTASSIUM CHLORIDE 750 MG/1
CAPSULE, EXTENDED RELEASE ORAL
Qty: 180 CAPSULE | Refills: 1 | Status: SHIPPED | OUTPATIENT
Start: 2020-03-23 | End: 2020-10-26

## 2020-03-23 NOTE — TELEPHONE ENCOUNTER
10/28/2019 NPLR  Everything looks great today, good job!   Continue current medications  Stay active along with a healthy diet  Follow up in 1 year    12/12/19 EKG

## 2020-07-22 ENCOUNTER — APPOINTMENT (OUTPATIENT)
Dept: ULTRASOUND IMAGING | Age: 70
End: 2020-07-22
Payer: MEDICARE

## 2020-07-22 ENCOUNTER — HOSPITAL ENCOUNTER (EMERGENCY)
Age: 70
Discharge: HOME | End: 2020-07-22
Payer: MEDICARE

## 2020-07-22 ENCOUNTER — APPOINTMENT (OUTPATIENT)
Dept: GENERAL RADIOLOGY | Age: 70
End: 2020-07-22
Payer: MEDICARE

## 2020-07-22 ENCOUNTER — APPOINTMENT (OUTPATIENT)
Dept: CT IMAGING | Age: 70
End: 2020-07-22
Payer: MEDICARE

## 2020-07-22 VITALS
RESPIRATION RATE: 18 BRPM | SYSTOLIC BLOOD PRESSURE: 124 MMHG | BODY MASS INDEX: 31.15 KG/M2 | DIASTOLIC BLOOD PRESSURE: 86 MMHG | OXYGEN SATURATION: 99 % | HEART RATE: 78 BPM | WEIGHT: 165 LBS | HEIGHT: 61 IN | TEMPERATURE: 98.4 F

## 2020-07-22 LAB
A/G RATIO: 1.5 (ref 1.1–2.2)
ALBUMIN SERPL-MCNC: 4.5 G/DL (ref 3.4–5)
ALP BLD-CCNC: 68 U/L (ref 40–129)
ALT SERPL-CCNC: 16 U/L (ref 10–40)
ANION GAP SERPL CALCULATED.3IONS-SCNC: 13 MMOL/L (ref 3–16)
AST SERPL-CCNC: 16 U/L (ref 15–37)
BASOPHILS ABSOLUTE: 0 K/UL (ref 0–0.2)
BASOPHILS RELATIVE PERCENT: 0.3 %
BILIRUB SERPL-MCNC: 1.9 MG/DL (ref 0–1)
BILIRUBIN URINE: NEGATIVE
BLOOD, URINE: ABNORMAL
BUN BLDV-MCNC: 20 MG/DL (ref 7–20)
CALCIUM SERPL-MCNC: 9.7 MG/DL (ref 8.3–10.6)
CHLORIDE BLD-SCNC: 97 MMOL/L (ref 99–110)
CLARITY: CLEAR
CO2: 25 MMOL/L (ref 21–32)
COLOR: YELLOW
CREAT SERPL-MCNC: 0.8 MG/DL (ref 0.6–1.2)
EKG ATRIAL RATE: 75 BPM
EKG DIAGNOSIS: NORMAL
EKG P AXIS: 45 DEGREES
EKG P-R INTERVAL: 176 MS
EKG Q-T INTERVAL: 394 MS
EKG QRS DURATION: 88 MS
EKG QTC CALCULATION (BAZETT): 439 MS
EKG R AXIS: -51 DEGREES
EKG T AXIS: 86 DEGREES
EKG VENTRICULAR RATE: 75 BPM
EOSINOPHILS ABSOLUTE: 0 K/UL (ref 0–0.6)
EOSINOPHILS RELATIVE PERCENT: 0.2 %
EPITHELIAL CELLS, UA: NORMAL /HPF (ref 0–5)
GFR AFRICAN AMERICAN: >60
GFR NON-AFRICAN AMERICAN: >60
GLOBULIN: 3.1 G/DL
GLUCOSE BLD-MCNC: 164 MG/DL (ref 70–99)
GLUCOSE URINE: NEGATIVE MG/DL
HCT VFR BLD CALC: 43.7 % (ref 36–48)
HEMOGLOBIN: 14.7 G/DL (ref 12–16)
KETONES, URINE: NEGATIVE MG/DL
LEUKOCYTE ESTERASE, URINE: NEGATIVE
LIPASE: 26 U/L (ref 13–60)
LYMPHOCYTES ABSOLUTE: 0.8 K/UL (ref 1–5.1)
LYMPHOCYTES RELATIVE PERCENT: 7.6 %
MCH RBC QN AUTO: 29.4 PG (ref 26–34)
MCHC RBC AUTO-ENTMCNC: 33.7 G/DL (ref 31–36)
MCV RBC AUTO: 87.1 FL (ref 80–100)
MICROSCOPIC EXAMINATION: YES
MONOCYTES ABSOLUTE: 0.9 K/UL (ref 0–1.3)
MONOCYTES RELATIVE PERCENT: 8.9 %
NEUTROPHILS ABSOLUTE: 8.5 K/UL (ref 1.7–7.7)
NEUTROPHILS RELATIVE PERCENT: 83 %
NITRITE, URINE: NEGATIVE
PDW BLD-RTO: 13.7 % (ref 12.4–15.4)
PH UA: 6.5 (ref 5–8)
PLATELET # BLD: 249 K/UL (ref 135–450)
PMV BLD AUTO: 9.2 FL (ref 5–10.5)
POTASSIUM SERPL-SCNC: 3.5 MMOL/L (ref 3.5–5.1)
PROTEIN UA: NEGATIVE MG/DL
RBC # BLD: 5.01 M/UL (ref 4–5.2)
RBC UA: NORMAL /HPF (ref 0–4)
SODIUM BLD-SCNC: 135 MMOL/L (ref 136–145)
SPECIFIC GRAVITY UA: <=1.005 (ref 1–1.03)
TOTAL PROTEIN: 7.6 G/DL (ref 6.4–8.2)
TROPONIN: <0.01 NG/ML
URINE TYPE: ABNORMAL
UROBILINOGEN, URINE: 0.2 E.U./DL
WBC # BLD: 10.2 K/UL (ref 4–11)
WBC UA: NORMAL /HPF (ref 0–5)

## 2020-07-22 PROCEDURE — 93005 ELECTROCARDIOGRAM TRACING: CPT | Performed by: NURSE PRACTITIONER

## 2020-07-22 PROCEDURE — 83690 ASSAY OF LIPASE: CPT

## 2020-07-22 PROCEDURE — 2580000003 HC RX 258: Performed by: NURSE PRACTITIONER

## 2020-07-22 PROCEDURE — 84484 ASSAY OF TROPONIN QUANT: CPT

## 2020-07-22 PROCEDURE — 74177 CT ABD & PELVIS W/CONTRAST: CPT

## 2020-07-22 PROCEDURE — 76705 ECHO EXAM OF ABDOMEN: CPT

## 2020-07-22 PROCEDURE — 80053 COMPREHEN METABOLIC PANEL: CPT

## 2020-07-22 PROCEDURE — 6360000004 HC RX CONTRAST MEDICATION: Performed by: NURSE PRACTITIONER

## 2020-07-22 PROCEDURE — 81001 URINALYSIS AUTO W/SCOPE: CPT

## 2020-07-22 PROCEDURE — 85025 COMPLETE CBC W/AUTO DIFF WBC: CPT

## 2020-07-22 PROCEDURE — 71260 CT THORAX DX C+: CPT

## 2020-07-22 PROCEDURE — 70450 CT HEAD/BRAIN W/O DYE: CPT

## 2020-07-22 PROCEDURE — 99284 EMERGENCY DEPT VISIT MOD MDM: CPT

## 2020-07-22 PROCEDURE — 71046 X-RAY EXAM CHEST 2 VIEWS: CPT

## 2020-07-22 PROCEDURE — 87086 URINE CULTURE/COLONY COUNT: CPT

## 2020-07-22 PROCEDURE — 93010 ELECTROCARDIOGRAM REPORT: CPT | Performed by: INTERNAL MEDICINE

## 2020-07-22 PROCEDURE — 6370000000 HC RX 637 (ALT 250 FOR IP): Performed by: NURSE PRACTITIONER

## 2020-07-22 RX ORDER — ONDANSETRON 4 MG/1
4 TABLET, ORALLY DISINTEGRATING ORAL ONCE
Status: COMPLETED | OUTPATIENT
Start: 2020-07-22 | End: 2020-07-22

## 2020-07-22 RX ORDER — DICYCLOMINE HYDROCHLORIDE 10 MG/1
10 CAPSULE ORAL ONCE
Status: COMPLETED | OUTPATIENT
Start: 2020-07-22 | End: 2020-07-22

## 2020-07-22 RX ORDER — CIPROFLOXACIN 500 MG/1
500 TABLET, FILM COATED ORAL ONCE
Status: COMPLETED | OUTPATIENT
Start: 2020-07-22 | End: 2020-07-22

## 2020-07-22 RX ORDER — CIPROFLOXACIN 500 MG/1
500 TABLET, FILM COATED ORAL 2 TIMES DAILY
Qty: 20 TABLET | Refills: 0 | Status: SHIPPED | OUTPATIENT
Start: 2020-07-22 | End: 2020-08-01

## 2020-07-22 RX ORDER — ACETAMINOPHEN 160 MG
TABLET,DISINTEGRATING ORAL
COMMUNITY

## 2020-07-22 RX ORDER — METRONIDAZOLE 250 MG/1
500 TABLET ORAL ONCE
Status: COMPLETED | OUTPATIENT
Start: 2020-07-22 | End: 2020-07-22

## 2020-07-22 RX ORDER — DICYCLOMINE HYDROCHLORIDE 10 MG/1
10 CAPSULE ORAL EVERY 6 HOURS PRN
Qty: 20 CAPSULE | Refills: 0 | Status: SHIPPED | OUTPATIENT
Start: 2020-07-22 | End: 2021-10-11 | Stop reason: ALTCHOICE

## 2020-07-22 RX ORDER — ONDANSETRON 4 MG/1
4 TABLET, ORALLY DISINTEGRATING ORAL EVERY 8 HOURS PRN
Qty: 20 TABLET | Refills: 0 | Status: SHIPPED | OUTPATIENT
Start: 2020-07-22 | End: 2021-10-11

## 2020-07-22 RX ORDER — 0.9 % SODIUM CHLORIDE 0.9 %
1000 INTRAVENOUS SOLUTION INTRAVENOUS ONCE
Status: COMPLETED | OUTPATIENT
Start: 2020-07-22 | End: 2020-07-22

## 2020-07-22 RX ORDER — METRONIDAZOLE 500 MG/1
500 TABLET ORAL 3 TIMES DAILY
Qty: 30 TABLET | Refills: 0 | Status: SHIPPED | OUTPATIENT
Start: 2020-07-22 | End: 2020-08-01

## 2020-07-22 RX ADMIN — DICYCLOMINE HYDROCHLORIDE 10 MG: 10 CAPSULE ORAL at 17:55

## 2020-07-22 RX ADMIN — METRONIDAZOLE 500 MG: 250 TABLET ORAL at 17:55

## 2020-07-22 RX ADMIN — IOPAMIDOL 75 ML: 755 INJECTION, SOLUTION INTRAVENOUS at 14:51

## 2020-07-22 RX ADMIN — CIPROFLOXACIN 500 MG: 500 TABLET, FILM COATED ORAL at 17:55

## 2020-07-22 RX ADMIN — SODIUM CHLORIDE 1000 ML: 9 INJECTION, SOLUTION INTRAVENOUS at 13:14

## 2020-07-22 RX ADMIN — ONDANSETRON 4 MG: 4 TABLET, ORALLY DISINTEGRATING ORAL at 17:55

## 2020-07-22 NOTE — ED PROVIDER NOTES
any known injury or trauma and that this is been going on for several months and she would like it to be investigated. Patient has a history of atrial fibrillation and is anticoagulated on Eliquis and denies missing any doses. Patient is a history of hypothyroid and is currently medicated with levothyroxine. Patient denies any recent travel, known sick contacts, long car rides or plane rides, history of DVT or pulmonary embolism, leg swelling or calf pain, palpitations. No other complaints, modifying factors or associated symptoms. Nursing notes reviewed. Past Medical History:   Diagnosis Date    A-fib [facility])     one time \"years ago\", due to hypothyroidism     Hyperlipidemia     Hypothyroid      Past Surgical History:   Procedure Laterality Date    APPENDECTOMY      CARPAL TUNNEL RELEASE      HERNIA REPAIR      KNEE SURGERY       History reviewed. No pertinent family history.   Social History     Socioeconomic History    Marital status:      Spouse name: Not on file    Number of children: Not on file    Years of education: Not on file    Highest education level: Not on file   Occupational History    Not on file   Social Needs    Financial resource strain: Not on file    Food insecurity     Worry: Not on file     Inability: Not on file    Transportation needs     Medical: Not on file     Non-medical: Not on file   Tobacco Use    Smoking status: Never Smoker    Smokeless tobacco: Never Used   Substance and Sexual Activity    Alcohol use: Yes     Comment: occasional     Drug use: No    Sexual activity: Not on file   Lifestyle    Physical activity     Days per week: Not on file     Minutes per session: Not on file    Stress: Not on file   Relationships    Social connections     Talks on phone: Not on file     Gets together: Not on file     Attends Yarsanism service: Not on file     Active member of club or organization: Not on file     Attends meetings of clubs or organizations: Not on file     Relationship status: Not on file    Intimate partner violence     Fear of current or ex partner: Not on file     Emotionally abused: Not on file     Physically abused: Not on file     Forced sexual activity: Not on file   Other Topics Concern    Not on file   Social History Narrative    Not on file     No current facility-administered medications for this encounter.       Current Outpatient Medications   Medication Sig Dispense Refill    Cholecalciferol (VITAMIN D3) 50 MCG (2000 UT) CAPS Take by mouth      ciprofloxacin (CIPRO) 500 MG tablet Take 1 tablet by mouth 2 times daily for 10 days 20 tablet 0    metroNIDAZOLE (FLAGYL) 500 MG tablet Take 1 tablet by mouth 3 times daily for 10 days 30 tablet 0    dicyclomine (BENTYL) 10 MG capsule Take 1 capsule by mouth every 6 hours as needed (cramps) 20 capsule 0    ondansetron (ZOFRAN ODT) 4 MG disintegrating tablet Take 1 tablet by mouth every 8 hours as needed for Nausea 20 tablet 0    propafenone (RYTHMOL SR) 225 MG extended release capsule TAKE 1 CAPSULE EVERY 12 HOURS 180 capsule 1    potassium chloride (MICRO-K) 10 MEQ extended release capsule TAKE 1 CAPSULE TWICE DAILY 180 capsule 1    hydrochlorothiazide (HYDRODIURIL) 25 MG tablet TAKE 1 TABLET EVERY DAY 90 tablet 3    apixaban (ELIQUIS) 5 MG TABS tablet TAKE 1 TABLET TWICE DAILY 180 tablet 2    nitroGLYCERIN (NITROSTAT) 0.4 MG SL tablet Place 1 tablet under the tongue every 5 minutes as needed for Chest pain 25 tablet 1    lovastatin (MEVACOR) 40 MG tablet Take 1 tablet by mouth nightly 30 tablet 0    levothyroxine (SYNTHROID) 150 MCG tablet 137 mcg Daily Pt reporting she takes 137mcg 6 days a week and the 7th day she takes 1/2 tablet      zolpidem (AMBIEN) 5 MG tablet Take 5 mg by mouth nightly as needed for Sleep (3 X weekly)       Omega-3 Fatty Acids (OMEGA 3 PO) Take 1,200 mg by mouth 3 times daily        No Known Allergies    REVIEW OF SYSTEMS  10 systems reviewed, pertinent positives per HPI otherwise noted to be negative    PHYSICAL EXAM  /86   Pulse 78   Temp 98.4 °F (36.9 °C) (Oral)   Resp 18   Ht 5' 1\" (1.549 m)   Wt 165 lb (74.8 kg)   SpO2 99%   BMI 31.18 kg/m²   GENERAL APPEARANCE: Awake and alert. Cooperative. No acute distress. Vital signs are stable. Well appearing and non toxic. Afebrile. HEAD: Normocephalic. Atraumatic. EYES: PERRL. EOM's grossly intact. ENT: Mucous membranes are dry. NECK: Supple. Normal ROM. No cervical spine tenderness. No nuchal rigidity. HEART: RRR. Distal pulses are equal and intact. Cap refill less than 2 seconds. LUNGS: Respirations unlabored. CTAB. Good air exchange. Speaking comfortably in full sentences. No wheezing, rhonchi, rales, stridor. ABDOMEN: Soft. Non-distended. Generalized tenderness to deep palpation notably in the midepigastric and right upper quadrant. No guarding or rebound. No masses. No organomegaly. No rigidity. Bowel sounds are present all 4 quadrants. Negative reyes's. Negative McBurney's point. Negative CVA tenderness. EXTREMITIES: No peripheral edema. Moves all extremities equally. All extremities neurovascularly intact. SKIN: Warm and dry. No acute rashes. NEUROLOGICAL: Alert and oriented. No gross facial drooping. Strength 5/5, sensation intact. PSYCHIATRIC: Normal mood and affect. SCREENINGS      NIH Score  NIH Stroke Scale  NIH Stroke Scale Assessed: Yes  Interval: Baseline  Level of Consciousness (1a. ): Alert  LOC Questions (1b. ):  Answers both correctly  LOC Commands (1c. ): Performs both tasks correctly  Best Gaze (2. ): Normal  Visual (3. ): No visual loss  Facial Palsy (4. ): Normal symmetrical movement  Motor Arm, Left (5a. ): No drift  Motor Arm, Right (5b. ): No drift  Motor Leg, Left (6a. ): No drift  Motor Leg, Right (6b. ): No drift  Limb Ataxia (7. ): Absent  Sensory (8. ): Normal  Best Language (9. ): No aphasia  Dysarthria (10. ): Normal  Extinction and Inattention (11): No abnormality  Total: 0        RADIOLOGY  Xr Chest (2 Vw)    Result Date: 7/22/2020  EXAMINATION: TWO XRAY VIEWS OF THE CHEST 7/22/2020 12:20 pm COMPARISON: 12/12/2019 HISTORY: ORDERING SYSTEM PROVIDED HISTORY: elevated hr TECHNOLOGIST PROVIDED HISTORY: Reason for exam:->elevated hr Reason for Exam: RAPID HEART RATE Acuity: Acute Type of Exam: Initial FINDINGS: Normal heart size and pulmonary vasculature. No focal consolidations, pleural effusions, or pneumothorax. No evidence of acute process. Ct Head Wo Contrast    Result Date: 7/22/2020  EXAMINATION: CT OF THE HEAD WITHOUT CONTRAST  7/22/2020 2:35 pm TECHNIQUE: CT of the head was performed without the administration of intravenous contrast. Dose modulation, iterative reconstruction, and/or weight based adjustment of the mA/kV was utilized to reduce the radiation dose to as low as reasonably achievable. COMPARISON: November 6, 2014 HISTORY: ORDERING SYSTEM PROVIDED HISTORY: pain to the back of head eliquis report of numbness in right toes and lip weeks ago that resolved TECHNOLOGIST PROVIDED HISTORY: Reason for exam:->pain to the back of head eliquis report of numbness in right toes and lip weeks ago that resolved Has a \"code stroke\" or \"stroke alert\" been called? ->No Reason for Exam: pain in back of head off and on  x 1 month FINDINGS: BRAIN/VENTRICLES: There is no acute intracranial hemorrhage, mass effect or midline shift. No abnormal extra-axial fluid collection. The gray-white differentiation is maintained without evidence of an acute infarct. There is no evidence of hydrocephalus. ORBITS: The visualized portion of the orbits demonstrate no acute abnormality. SINUSES: The visualized paranasal sinuses and mastoid air cells demonstrate no acute abnormality. SOFT TISSUES/SKULL:  No acute abnormality of the visualized skull or soft tissues. No acute intracranial abnormality.      Ct Abdomen Pelvis W Iv Contrast Additional Contrast? defect to suggest pulmonary embolism. Main pulmonary artery is normal in caliber. Mediastinum: No evidence of mediastinal lymphadenopathy. The heart and pericardium demonstrate no acute abnormality. There is no acute abnormality of the thoracic aorta. Lungs/pleura: The lungs are without acute process. There is minor dependent atelectasis bilaterally. No focal consolidation or pulmonary edema. No evidence of pleural effusion or pneumothorax. Upper Abdomen: Limited images of the upper abdomen are notable for a small hiatal hernia at the GE junction. Soft Tissues/Bones: No acute bone or soft tissue abnormality. No evidence of pulmonary embolism or acute pulmonary abnormality. Incidental findings include minor atelectatic changes in the lungs and a small hiatal hernia. ED COURSE/MDM  Patient seen and evaluated. Old records reviewed. Diagnostic testing reviewed and results discussed. I have independently evaluated this patient based upon my scope of practice. Supervising physician was in the department for consultation as needed. Chikis Kwan presented to the ED today with above noted complaints. EKG interpreted by my attending physician. No ST elevation. Initial emergency department work-up urinalysis negative for infection or hematuria. CBC unremarkable no leukocytosis, bandemia, anemia. CMP unremarkable no acute kidney injury or electrolyte abnormality. Troponin less than 0.01. Lipase 26. ALT and AST unremarkable. Alk phos 68. Total bili 1.9. Chest x-ray shows no evidence of acute process. CT of the head no acute intracranial abnormality. CT of the chest no evidence of pulmonary embolism or acute pulmonary abnormality. Incidental findings include minor atelectatic changes in the lungs and a small hiatal hernia.   CT of the abdomen and pelvis abnormal wall thickening and inflammatory change involving and surrounding the sigmoid colon in the pelvis difficult to determine as to whether or not this is due to early colitis or diverticulitis. Fibroid uterus. Given patient's symptoms and the CT of the abdomen findings I did initiate patient on antibiotic therapy for colitis. Gallbladder ultrasound cholelithiasis without acute cholecystitis. I discussed the above findings with patient patient provided with referral for gastroenterology for possible colitis and cholelithiasis. We also discussed follow-up with primary care physician for management of thyroid. Patient was provided with Bentyl and Zofran for symptoms as needed. Patient is agreeable with this plan of care. While in ED patient received   Medications   0.9 % sodium chloride bolus (0 mLs Intravenous Stopped 7/22/20 1659)   iopamidol (ISOVUE-370) 76 % injection 75 mL (75 mLs Intravenous Given 7/22/20 1451)   ciprofloxacin (CIPRO) tablet 500 mg (500 mg Oral Given 7/22/20 1755)   metroNIDAZOLE (FLAGYL) tablet 500 mg (500 mg Oral Given 7/22/20 1755)   dicyclomine (BENTYL) capsule 10 mg (10 mg Oral Given 7/22/20 1755)   ondansetron (ZOFRAN-ODT) disintegrating tablet 4 mg (4 mg Oral Given 7/22/20 1755)             At this point I do not feel the patient requires further work up and it is reasonable to discharge the patient. A discussion was had with the patient and/or their surrogate regarding diagnosis, diagnostic testing results, treatment/ plan of care, and follow up. There was shared decision-making between myself as well as the patient and/or their surrogate and we are all in agreement with discharge home. There was an opportunity for questions and all questions were answered to the best of my ability and to the satisfaction of the patient and/or patient family. Patient will follow up with gi for further evaluation/treatment. The patient was given strict return precautions as we discussed symptoms that would necessitate return to the ED. Patient will return to ED for new/worsening symptoms.  The patient verbalized their understanding and agreement with the above plan. Please refer to AVS for further details regarding discharge instructions.       Results for orders placed or performed during the hospital encounter of 07/22/20   CBC auto differential   Result Value Ref Range    WBC 10.2 4.0 - 11.0 K/uL    RBC 5.01 4.00 - 5.20 M/uL    Hemoglobin 14.7 12.0 - 16.0 g/dL    Hematocrit 43.7 36.0 - 48.0 %    MCV 87.1 80.0 - 100.0 fL    MCH 29.4 26.0 - 34.0 pg    MCHC 33.7 31.0 - 36.0 g/dL    RDW 13.7 12.4 - 15.4 %    Platelets 484 704 - 821 K/uL    MPV 9.2 5.0 - 10.5 fL    Neutrophils % 83.0 %    Lymphocytes % 7.6 %    Monocytes % 8.9 %    Eosinophils % 0.2 %    Basophils % 0.3 %    Neutrophils Absolute 8.5 (H) 1.7 - 7.7 K/uL    Lymphocytes Absolute 0.8 (L) 1.0 - 5.1 K/uL    Monocytes Absolute 0.9 0.0 - 1.3 K/uL    Eosinophils Absolute 0.0 0.0 - 0.6 K/uL    Basophils Absolute 0.0 0.0 - 0.2 K/uL   Comprehensive metabolic panel   Result Value Ref Range    Sodium 135 (L) 136 - 145 mmol/L    Potassium 3.5 3.5 - 5.1 mmol/L    Chloride 97 (L) 99 - 110 mmol/L    CO2 25 21 - 32 mmol/L    Anion Gap 13 3 - 16    Glucose 164 (H) 70 - 99 mg/dL    BUN 20 7 - 20 mg/dL    CREATININE 0.8 0.6 - 1.2 mg/dL    GFR Non-African American >60 >60    GFR African American >60 >60    Calcium 9.7 8.3 - 10.6 mg/dL    Total Protein 7.6 6.4 - 8.2 g/dL    Alb 4.5 3.4 - 5.0 g/dL    Albumin/Globulin Ratio 1.5 1.1 - 2.2    Total Bilirubin 1.9 (H) 0.0 - 1.0 mg/dL    Alkaline Phosphatase 68 40 - 129 U/L    ALT 16 10 - 40 U/L    AST 16 15 - 37 U/L    Globulin 3.1 g/dL   Troponin   Result Value Ref Range    Troponin <0.01 <0.01 ng/mL   Urinalysis, reflex to microscopic   Result Value Ref Range    Color, UA Yellow Straw/Yellow    Clarity, UA Clear Clear    Glucose, Ur Negative Negative mg/dL    Bilirubin Urine Negative Negative    Ketones, Urine Negative Negative mg/dL    Specific Gravity, UA <=1.005 1.005 - 1.030    Blood, Urine TRACE-INTACT (A) Negative    pH, UA 6.5 5.0 - 8.0    Protein, UA Negative Negative mg/dL    Urobilinogen, Urine 0.2 <2.0 E.U./dL    Nitrite, Urine Negative Negative    Leukocyte Esterase, Urine Negative Negative    Microscopic Examination YES     Urine Type NotGiven    Microscopic Urinalysis   Result Value Ref Range    WBC, UA 0-2 0 - 5 /HPF    RBC, UA 0-2 0 - 4 /HPF    Epithelial Cells, UA 0-1 0 - 5 /HPF   Lipase   Result Value Ref Range    Lipase 26.0 13.0 - 60.0 U/L   EKG 12 Lead   Result Value Ref Range    Ventricular Rate 75 BPM    Atrial Rate 75 BPM    P-R Interval 176 ms    QRS Duration 88 ms    Q-T Interval 394 ms    QTc Calculation (Bazett) 439 ms    P Axis 45 degrees    R Axis -51 degrees    T Axis 86 degrees    Diagnosis       Normal sinus rhythmLeft anterior fascicular blockNonspecific T wave abnormalityAbnormal ECGWhen compared with ECG of 12-DEC-2019 16:10,Nonspecific T wave abnormality, worse in Anterior leadsQT has lengthenedConfirmed by Kaden Poe MD, [facility] ([id number]) on 7/22/2020 6:49:47 PM       I estimate there is LOW risk for ACUTE APPENDICITIS, BOWEL OBSTRUCTION, CHOLECYSTITIS, DIVERTICULITIS, INCARCERATED HERNIA, PANCREATITIS, PELVIC INFLAMMATORY DISEASE, PERFORATED BOWEL or ULCER, PREGNANCY, or TUBO-OVARIAN ABSCESS, thus I consider the discharge disposition reasonable. Also, there is no evidence or peritonitis, sepsis, or toxicity. Carter Tapia and I have discussed the diagnosis and risks, and we agree with discharging home to follow-up with their primary doctor. We also discussed returning to the Emergency Department immediately if new or worsening symptoms occur. We have discussed the symptoms which are most concerning (e.g., bloody stool, fever, changing or worsening pain, vomiting) that necessitate immediate return. Final Impression    1. Calculus of gallbladder without cholecystitis without obstruction    2. Colitis    3.  Hiatal hernia        Blood pressure 124/86, pulse 78, temperature 98.4 °F (36.9 °C), temperature source Oral, resp. rate 18, height 5' 1\" (1.549 m), weight 165 lb (74.8 kg), SpO2 99 %.mdm    Patient was sent home with a prescription for below medication/s. I did [place] patient on appropriate use of these medication. Discharge Medication List as of 7/22/2020  5:18 PM      START taking these medications    Details   ciprofloxacin (CIPRO) 500 MG tablet Take 1 tablet by mouth 2 times daily for 10 days, Disp-20 tablet,R-0Print      metroNIDAZOLE (FLAGYL) 500 MG tablet Take 1 tablet by mouth 3 times daily for 10 days, Disp-30 tablet,R-0Print      dicyclomine (BENTYL) 10 MG capsule Take 1 capsule by mouth every 6 hours as needed (cramps), Disp-20 capsule,R-0Print      ondansetron (ZOFRAN ODT) 4 MG disintegrating tablet Take 1 tablet by mouth every 8 hours as needed for Nausea, Disp-20 tablet,R-0Print                 FOLLOW UP  [facility]  ED  [address] [facility] [id number]  [address] [place] [address] [employer]  [phone]    Call   follow up for colitis and cholelithiasis    Min Doyle MD  [address] [place] [address]  [phone]      For management of thyroid      DISPOSITION  Patient was discharged to home in good condition. Comment: Please note this report has been produced using speech recognition software and may contain errors related to that system including errors in grammar, punctuation, and spelling, as well as words and phrases that may be inappropriate. If there are any questions or concerns please feel free to contact the dictating provider for clarification.             [id number] Galeano, APRN - CNP  07/22/20 1770

## 2020-07-22 NOTE — ED NOTES
Pt back to room from radiology, ambulated to BR with steady gait, urine sample obtained and sent to lab.      Corina Gauthier RN  07/22/20 2143

## 2020-07-22 NOTE — ED NOTES
Pt discharged to home. IV removed, tip intact and pressure applied. Pt given discharge instructions and verbalized understanding. Pt ambulatory at discharge and left without incident.       Lana Taylor RN  07/22/20 5218

## 2020-07-23 LAB — URINE CULTURE, ROUTINE: NORMAL

## 2020-10-05 ENCOUNTER — OFFICE VISIT (OUTPATIENT)
Dept: CARDIOLOGY CLINIC | Age: 70
End: 2020-10-05
Payer: MEDICARE

## 2020-10-05 VITALS
WEIGHT: 153.5 LBS | HEART RATE: 60 BPM | OXYGEN SATURATION: 98 % | SYSTOLIC BLOOD PRESSURE: 110 MMHG | HEIGHT: 61 IN | DIASTOLIC BLOOD PRESSURE: 80 MMHG | BODY MASS INDEX: 28.98 KG/M2

## 2020-10-05 PROCEDURE — G8484 FLU IMMUNIZE NO ADMIN: HCPCS | Performed by: INTERNAL MEDICINE

## 2020-10-05 PROCEDURE — G8400 PT W/DXA NO RESULTS DOC: HCPCS | Performed by: INTERNAL MEDICINE

## 2020-10-05 PROCEDURE — 3017F COLORECTAL CA SCREEN DOC REV: CPT | Performed by: INTERNAL MEDICINE

## 2020-10-05 PROCEDURE — 1123F ACP DISCUSS/DSCN MKR DOCD: CPT | Performed by: INTERNAL MEDICINE

## 2020-10-05 PROCEDURE — G8427 DOCREV CUR MEDS BY ELIG CLIN: HCPCS | Performed by: INTERNAL MEDICINE

## 2020-10-05 PROCEDURE — 4040F PNEUMOC VAC/ADMIN/RCVD: CPT | Performed by: INTERNAL MEDICINE

## 2020-10-05 PROCEDURE — 1090F PRES/ABSN URINE INCON ASSESS: CPT | Performed by: INTERNAL MEDICINE

## 2020-10-05 PROCEDURE — G8417 CALC BMI ABV UP PARAM F/U: HCPCS | Performed by: INTERNAL MEDICINE

## 2020-10-05 PROCEDURE — 99204 OFFICE O/P NEW MOD 45 MIN: CPT | Performed by: INTERNAL MEDICINE

## 2020-10-05 PROCEDURE — 1036F TOBACCO NON-USER: CPT | Performed by: INTERNAL MEDICINE

## 2020-10-05 NOTE — PATIENT INSTRUCTIONS
Plan:  1. Continue current medications as prescribed. 2. Continue to monitor for reoccurrence of AF. 3. Follow up with EP NP in 1 year and me in 2 years.

## 2020-10-05 NOTE — PROGRESS NOTES
[facility]   Cardiac Consultation    Primary Care Physician: Cliff Beltran MD     Chief Complaint:   Chief Complaint   Patient presents with    1 Year Follow Up    Atrial Fibrillation      HPI:  Ilda Arellano is a 78 y.o. female who presents today for follow up regarding symptomatic paroxysmal atrial fibrillation. She was originally found to be in AF around 2003 when diagnosed with hyperthyroidism. She was admitted on 8/31/13 for tachycardia and was found to be in AF. She underwent an echocardiogram on 9/3/13 which demonstrated an EF of 55% with no regional wall motion abnormalities. She underwent a Left Heart Cath on 9/3/13 which demonstrated normal coronary arteries and normal LVEF. She was admitted again on 11/3/14 for AF. She has been on Rythmol since 2014 for PAF. In 12/2015 her HR was in the 40's so her metoprolol was decreased to 12.5 mg BID. On 11/4/16 her metoprolol was stopped due to bradycardia. She underwent a Stress Echo on 4/3/18 which was normal. She has previously been stable on both diltiazem and Rythmol. Today 10/5/20 she reports she is doing well from a cardiac standpoint. She reports  she is aware when she is in AF. She reports she is not very active at home. She does not exercise. She reports she is able to ascend a flight of stairs with no shortness of breath. She reports she is taking her medications as prescribed and tolerates them well. She reports no abnormal bleeding or bruising. Patient currently denies edema, chest pain, sob, palpitations, dizziness or syncope. Past Medical History:   has a past medical history of A-fib ([address] Hyperlipidemia, and Hypothyroid. Surgical History:   has a past surgical history that includes Appendectomy; Carpal tunnel release; hernia repair; and knee surgery. Social History:   reports that she has never smoked. She has never used smokeless tobacco. She reports current alcohol use.  She reports that she does not use drugs. Family History:  family history is not on file. Home Medications:  Outpatient Encounter Medications as of 10/5/2020   Medication Sig Dispense Refill    apixaban (ELIQUIS) 5 MG TABS tablet TAKE 1 TABLET TWICE DAILY 180 tablet 2    Cholecalciferol (VITAMIN D3) 50 MCG (2000 UT) CAPS Take by mouth      propafenone (RYTHMOL SR) 225 MG extended release capsule TAKE 1 CAPSULE EVERY 12 HOURS 180 capsule 1    potassium chloride (MICRO-K) 10 MEQ extended release capsule TAKE 1 CAPSULE TWICE DAILY 180 capsule 1    hydrochlorothiazide (HYDRODIURIL) 25 MG tablet TAKE 1 TABLET EVERY DAY 90 tablet 3    nitroGLYCERIN (NITROSTAT) 0.4 MG SL tablet Place 1 tablet under the tongue every 5 minutes as needed for Chest pain 25 tablet 1    lovastatin (MEVACOR) 40 MG tablet Take 1 tablet by mouth nightly 30 tablet 0    levothyroxine (SYNTHROID) 150 MCG tablet 137 mcg Daily Pt reporting she takes 137mcg 5 days a week      zolpidem (AMBIEN) 5 MG tablet Take 5 mg by mouth nightly as needed for Sleep (3 X weekly)       Omega-3 Fatty Acids (OMEGA 3 PO) Take 1,200 mg by mouth 3 times daily       dicyclomine (BENTYL) 10 MG capsule Take 1 capsule by mouth every 6 hours as needed (cramps) (Patient not taking: Reported on 10/5/2020) 20 capsule 0    ondansetron (ZOFRAN ODT) 4 MG disintegrating tablet Take 1 tablet by mouth every 8 hours as needed for Nausea (Patient not taking: Reported on 10/5/2020) 20 tablet 0     No facility-administered encounter medications on file as of 10/5/2020. Allergies:  Patient has no known allergies. Review of Systems   Constitutional: Negative. HENT: Negative. Eyes: Negative. Respiratory: Negative. Cardiovascular: Negative. Gastrointestinal: Negative. Genitourinary: Negative. Musculoskeletal: Negative. Skin: Negative. Neurological: Negative. Hematological: Negative. Psychiatric/Behavioral: Negative.     /80   Pulse 60   Ht 5' 1\" (1.549 m) Wt 153 lb 8 oz (69.6 kg)   SpO2 98%   BMI 29.00 kg/m²     DATA:  STRESS ECHO: 4/3/18:   Echo    Baseline resting echocardiogram shows normal global LV systolic function    with an ejection fraction of 55% and uniform myocardial segmental wall    motion. Following stress there was uniform augmentation of all myocardial    segments with appropriate hyperdynamic LV systolic response to stress. ECG    <0.5mm ST segment depression. Normal exercise stress EKG portion. Arrhythmias    No evidence for significant arrhythmia. Baseline artifact. Symptoms    There was stress induced shortness of breath. Symptoms resolved with rest.       Objective:  Physical Exam   Constitutional: She is oriented to person, place, and time. She appears well-developed and well-nourished. HENT:   Head: Normocephalic and atraumatic. Eyes: Pupils are equal, round, and reactive to light. Neck: Normal range of motion. Cardiovascular: Normal rate, regular rhythm and normal heart sounds. Pulmonary/Chest: Effort normal and breath sounds normal.   Abdominal: Soft. No tenderness. Musculoskeletal: Normal range of motion. She exhibits no edema. Neurological: She is alert and oriented to person, place, and time. Skin: Skin is warm and dry. Psychiatric: She has a normal mood and affect. Assessment:  1. PAF-she has been on propafenone since 2014 and has had excellent suppression of her atrial arrhythmias. She has tolerated this medication without difficulty and has a normal QRS complex. Plan:  1. Continue Rythmol SR  2. Continue to monitor for reccurrence of AF. 3. Follow up with EP NP in 1 year and me in 2 years. QUALITY MEASURES  1. Tobacco Cessation Counseling: NA  2. Retake of BP if >140/90:   NA  3. Documentation to PCP/referring for new patient:  Sent to PCP at close of office visit  4. CAD patient on anti-platelet: NA  5. CAD patient on STATIN therapy:  Yes  6.  Patient with CHF and aFib on anticoagulation:Yes, Eliquis. This note was scribed in the presence of Becca Goddard MD by Talia Brewer. Bob Light RN.        Becca Goddard M.D.

## 2020-10-05 NOTE — LETTER
[facility], [place], operated by [facility]   Cardiac Consultation    Primary Care Physician: Matt Haines MD     Chief Complaint:   Chief Complaint   Patient presents with    1 Year Follow Up    Atrial Fibrillation      HPI:  Vijay Proctor is a 78 y.o. female who presents today for follow up regarding symptomatic paroxysmal atrial fibrillation. She was originally found to be in AF around 2003 when diagnosed with hyperthyroidism. She was admitted on 8/31/13 for tachycardia and was found to be in AF. She underwent an echocardiogram on 9/3/13 which demonstrated an EF of 55% with no regional wall motion abnormalities. She underwent a Left Heart Cath on 9/3/13 which demonstrated normal coronary arteries and normal LVEF. She was admitted again on 11/3/14 for AF. She has been on Rythmol since 2014 for PAF. In 12/2015 her HR was in the 40's so her metoprolol was decreased to 12.5 mg BID. On 11/4/16 her metoprolol was stopped due to bradycardia. She underwent a Stress Echo on 4/3/18 which was normal. She has previously been stable on both diltiazem and Rythmol. Today 10/5/20 she reports she is doing well from a cardiac standpoint. She reports  she is aware when she is in AF. She reports she is not very active at home. She does not exercise. She reports she is able to ascend a flight of stairs with no shortness of breath. She reports she is taking her medications as prescribed and tolerates them well. She reports no abnormal bleeding or bruising. Patient currently denies edema, chest pain, sob, palpitations, dizziness or syncope. Past Medical History:   has a past medical history of A-fib ([address] Hyperlipidemia, and Hypothyroid. Surgical History:   has a past surgical history that includes Appendectomy; Carpal tunnel release; hernia repair; and knee surgery. Social History:   reports that she has never smoked.  She has never used smokeless tobacco. She reports current alcohol use. She reports that she does not use drugs. Family History:  family history is not on file. Home Medications:  Outpatient Encounter Medications as of 10/5/2020   Medication Sig Dispense Refill    apixaban (ELIQUIS) 5 MG TABS tablet TAKE 1 TABLET TWICE DAILY 180 tablet 2    Cholecalciferol (VITAMIN D3) 50 MCG (2000 UT) CAPS Take by mouth      propafenone (RYTHMOL SR) 225 MG extended release capsule TAKE 1 CAPSULE EVERY 12 HOURS 180 capsule 1    potassium chloride (MICRO-K) 10 MEQ extended release capsule TAKE 1 CAPSULE TWICE DAILY 180 capsule 1    hydrochlorothiazide (HYDRODIURIL) 25 MG tablet TAKE 1 TABLET EVERY DAY 90 tablet 3    nitroGLYCERIN (NITROSTAT) 0.4 MG SL tablet Place 1 tablet under the tongue every 5 minutes as needed for Chest pain 25 tablet 1    lovastatin (MEVACOR) 40 MG tablet Take 1 tablet by mouth nightly 30 tablet 0    levothyroxine (SYNTHROID) 150 MCG tablet 137 mcg Daily Pt reporting she takes 137mcg 5 days a week      zolpidem (AMBIEN) 5 MG tablet Take 5 mg by mouth nightly as needed for Sleep (3 X weekly)       Omega-3 Fatty Acids (OMEGA 3 PO) Take 1,200 mg by mouth 3 times daily       dicyclomine (BENTYL) 10 MG capsule Take 1 capsule by mouth every 6 hours as needed (cramps) (Patient not taking: Reported on 10/5/2020) 20 capsule 0    ondansetron (ZOFRAN ODT) 4 MG disintegrating tablet Take 1 tablet by mouth every 8 hours as needed for Nausea (Patient not taking: Reported on 10/5/2020) 20 tablet 0     No facility-administered encounter medications on file as of 10/5/2020. Allergies:  Patient has no known allergies. Review of Systems   Constitutional: Negative. HENT: Negative. Eyes: Negative. Respiratory: Negative. Cardiovascular: Negative. Gastrointestinal: Negative. Genitourinary: Negative. Musculoskeletal: Negative. Skin: Negative. Neurological: Negative. Hematological: Negative. Psychiatric/Behavioral: Negative. /80   Pulse 60   Ht 5' 1\" (1.549 m)   Wt 153 lb 8 oz (69.6 kg)   SpO2 98%   BMI 29.00 kg/m²     DATA:  STRESS ECHO: 4/3/18:   Echo    Baseline resting echocardiogram shows normal global LV systolic function    with an ejection fraction of 55% and uniform myocardial segmental wall    motion. Following stress there was uniform augmentation of all myocardial    segments with appropriate hyperdynamic LV systolic response to stress. ECG    <0.5mm ST segment depression. Normal exercise stress EKG portion. Arrhythmias    No evidence for significant arrhythmia. Baseline artifact. Symptoms    There was stress induced shortness of breath. Symptoms resolved with rest.       Objective:  Physical Exam   Constitutional: She is oriented to person, place, and time. She appears well-developed and well-nourished. HENT:   Head: Normocephalic and atraumatic. Eyes: Pupils are equal, round, and reactive to light. Neck: Normal range of motion. Cardiovascular: Normal rate, regular rhythm and normal heart sounds. Pulmonary/Chest: Effort normal and breath sounds normal.   Abdominal: Soft. No tenderness. Musculoskeletal: Normal range of motion. She exhibits no edema. Neurological: She is alert and oriented to person, place, and time. Skin: Skin is warm and dry. Psychiatric: She has a normal mood and affect. Assessment:  1. PAF-she has been on propafenone since 2014 and has had excellent suppression of her atrial arrhythmias. She has tolerated this medication without difficulty and has a normal QRS complex. Plan:  1. Continue Rythmol SR  2. Continue to monitor for reccurrence of AF. 3. Follow up with EP NP in 1 year and me in 2 years. QUALITY MEASURES  1. Tobacco Cessation Counseling: NA  2. Retake of BP if >140/90:   NA  3.  Documentation to PCP/referring for new patient:  Sent to PCP at close of office visit

## 2020-10-06 PROCEDURE — 93000 ELECTROCARDIOGRAM COMPLETE: CPT | Performed by: INTERNAL MEDICINE

## 2020-10-06 RX ORDER — PROPAFENONE HYDROCHLORIDE 225 MG/1
CAPSULE, EXTENDED RELEASE ORAL
Qty: 180 CAPSULE | Refills: 3 | Status: SHIPPED | OUTPATIENT
Start: 2020-10-06 | End: 2021-10-11 | Stop reason: SDUPTHER

## 2020-10-26 RX ORDER — POTASSIUM CHLORIDE 750 MG/1
CAPSULE, EXTENDED RELEASE ORAL
Qty: 180 CAPSULE | Refills: 1 | Status: SHIPPED | OUTPATIENT
Start: 2020-10-26 | End: 2021-05-11 | Stop reason: SDUPTHER

## 2020-12-18 RX ORDER — HYDROCHLOROTHIAZIDE 25 MG/1
TABLET ORAL
Qty: 90 TABLET | Refills: 3 | Status: SHIPPED | OUTPATIENT
Start: 2020-12-18 | End: 2021-12-16

## 2021-04-30 DIAGNOSIS — I48.0 PAROXYSMAL ATRIAL FIBRILLATION (HCC): ICD-10-CM

## 2021-05-10 ENCOUNTER — TELEPHONE (OUTPATIENT)
Dept: CARDIOLOGY CLINIC | Age: 71
End: 2021-05-10

## 2021-05-11 RX ORDER — POTASSIUM CHLORIDE 750 MG/1
CAPSULE, EXTENDED RELEASE ORAL
Qty: 180 CAPSULE | Refills: 3 | Status: SHIPPED | OUTPATIENT
Start: 2021-05-11 | End: 2022-03-15

## 2021-05-11 NOTE — TELEPHONE ENCOUNTER
Refill sent to [facility] mail order pharmacy for potassium, please ensure she has a 1 year follow up with EPNP for 10/2021,

## 2021-10-11 ENCOUNTER — OFFICE VISIT (OUTPATIENT)
Dept: CARDIOLOGY CLINIC | Age: 71
End: 2021-10-11
Payer: MEDICARE

## 2021-10-11 VITALS
HEART RATE: 71 BPM | OXYGEN SATURATION: 98 % | SYSTOLIC BLOOD PRESSURE: 118 MMHG | WEIGHT: 156 LBS | BODY MASS INDEX: 29.45 KG/M2 | HEIGHT: 61 IN | DIASTOLIC BLOOD PRESSURE: 68 MMHG

## 2021-10-11 DIAGNOSIS — I10 ESSENTIAL HYPERTENSION: ICD-10-CM

## 2021-10-11 DIAGNOSIS — I48.0 PAROXYSMAL ATRIAL FIBRILLATION (HCC): Primary | ICD-10-CM

## 2021-10-11 PROCEDURE — G8427 DOCREV CUR MEDS BY ELIG CLIN: HCPCS | Performed by: NURSE PRACTITIONER

## 2021-10-11 PROCEDURE — G8484 FLU IMMUNIZE NO ADMIN: HCPCS | Performed by: NURSE PRACTITIONER

## 2021-10-11 PROCEDURE — 99214 OFFICE O/P EST MOD 30 MIN: CPT | Performed by: NURSE PRACTITIONER

## 2021-10-11 PROCEDURE — G8400 PT W/DXA NO RESULTS DOC: HCPCS | Performed by: NURSE PRACTITIONER

## 2021-10-11 PROCEDURE — G8417 CALC BMI ABV UP PARAM F/U: HCPCS | Performed by: NURSE PRACTITIONER

## 2021-10-11 PROCEDURE — 93000 ELECTROCARDIOGRAM COMPLETE: CPT | Performed by: NURSE PRACTITIONER

## 2021-10-11 PROCEDURE — 1036F TOBACCO NON-USER: CPT | Performed by: NURSE PRACTITIONER

## 2021-10-11 PROCEDURE — 1090F PRES/ABSN URINE INCON ASSESS: CPT | Performed by: NURSE PRACTITIONER

## 2021-10-11 PROCEDURE — 4040F PNEUMOC VAC/ADMIN/RCVD: CPT | Performed by: NURSE PRACTITIONER

## 2021-10-11 PROCEDURE — 3017F COLORECTAL CA SCREEN DOC REV: CPT | Performed by: NURSE PRACTITIONER

## 2021-10-11 PROCEDURE — 1123F ACP DISCUSS/DSCN MKR DOCD: CPT | Performed by: NURSE PRACTITIONER

## 2021-10-11 RX ORDER — PROPAFENONE HYDROCHLORIDE 225 MG/1
225 CAPSULE, EXTENDED RELEASE ORAL 2 TIMES DAILY
Qty: 180 CAPSULE | Refills: 3 | Status: SHIPPED | OUTPATIENT
Start: 2021-10-11 | End: 2022-11-04 | Stop reason: SDUPTHER

## 2021-10-11 NOTE — LETTER
[address]   Electrophysiology Outpatient Note              Date:  October 11, 2021  Patient name: Ankit Massey  YOB: 1950    Primary Care physician: Radha Escalante MD    HISTORY OF PRESENT ILLNESS: The patient is a 73 y.o.  female with a history of afib, sinus bradycardia, HTN, HLD, and hypothyroidism. LHC in 9/2013 showed mild LAD bridging. Most recent echo in 9/2013 showed an EF of 55%. Was started on Rythmol in 2014. Stress echo in 4/2018 was normal.     Today she is being seen for afib. EKG shows SR with a HR of 69. Patient complains of arm pain. Denies chest pain, palpitations, shortness of breath, and dizziness. Past Medical History:   has a past medical history of A-fib ([address] Hyperlipidemia, and Hypothyroid. Past Surgical History:   has a past surgical history that includes Appendectomy; Carpal tunnel release; hernia repair; and knee surgery. Home Medications:    Prior to Admission medications    Medication Sig Start Date End Date Taking?  Authorizing Provider   potassium chloride (MICRO-K) 10 MEQ extended release capsule TAKE 1 CAPSULE TWICE DAILY 5/11/21  Yes Brennan Alberts MD   apixaban (ELIQUIS) 5 MG TABS tablet TAKE 1 TABLET TWICE DAILY 5/3/21  Yes Brennan Alberts MD   hydroCHLOROthiazide (HYDRODIURIL) 25 MG tablet TAKE 1 TABLET EVERY DAY 12/18/20  Yes KLAUDIA Tomlin CNP   propafenone (RYTHMOL SR) 225 MG extended release capsule TAKE 1 CAPSULE EVERY 12 HOURS 10/6/20  Yes Brennan Alberts MD   Cholecalciferol (VITAMIN D3) 50 MCG (2000 UT) CAPS Take by mouth   Yes Historical Provider, MD   nitroGLYCERIN (NITROSTAT) 0.4 MG SL tablet Place 1 tablet under the tongue every 5 minutes as needed for Chest pain 3/5/18  Yes KLAUDIA Costa CNP   lovastatin (MEVACOR) 40 MG tablet Take 1 tablet by mouth nightly 8/2/16  Yes Andreia Jones MD   levothyroxine (SYNTHROID) 150 MCG tablet 137 mcg Daily Pt reporting she takes 137mcg 5 days a week 11/1/15  Yes Historical Provider, MD   zolpidem (AMBIEN) 5 MG tablet Take 5 mg by mouth nightly as needed for Sleep (3 X weekly)    Yes Historical Provider, MD   Omega-3 Fatty Acids (OMEGA 3 PO) Take 1,200 mg by mouth 3 times daily    Yes Historical Provider, MD   dicyclomine (BENTYL) 10 MG capsule Take 1 capsule by mouth every 6 hours as needed (cramps)  Patient not taking: Reported on 10/5/2020 7/22/20   KLAUDIA Martell CNP   ondansetron (ZOFRAN ODT) 4 MG disintegrating tablet Take 1 tablet by mouth every 8 hours as needed for Nausea  Patient not taking: Reported on 10/5/2020 7/22/20   KLAUDIA Martell CNP       Allergies:  Patient has no known allergies. Social History:   reports that she has never smoked. She has never used smokeless tobacco. She reports current alcohol use. She reports that she does not use drugs. Family History: family history is not on file. Review of Systems   Constitutional: Negative. HENT: Negative. Eyes: Negative. Respiratory: Negative. Cardiovascular: see HPI  Gastrointestinal: Negative. Genitourinary: Negative. Musculoskeletal: + arm pain   Skin: Negative. Neurological: Negative. Hematological: Negative. Psychiatric/Behavioral: Negative. PHYSICAL EXAM:    Vital signs:    /68   Pulse 71   Ht 5' 1\" (1.549 m)   Wt 156 lb (70.8 kg)   SpO2 98%   BMI 29.48 kg/m²      Constitutional and general appearance: alert, cooperative, no distress and appears stated age  [de-identified]: PERRL, no cervical lymphadenopathy. No masses palpable.  Normal oral mucosa  Respiratory:  · Normal excursion and expansion without use of accessory muscles  · Resp auscultation: Normal breath sounds without wheezing, rhonchi, and rales  Cardiovascular:  · The apical impulse is not displaced  · Heart tones are crisp and normal. regular S1 and S2.  · Jugular venous pulsation Normal  · The carotid upstroke is normal in amplitude and contour without delay or bruit  · Peripheral pulses are symmetrical and full   Abdomen:  · No masses or tenderness  · Bowel sounds present  Extremities:  ·  No cyanosis or clubbing  ·  No lower extremity edema  ·  Skin: warm and dry  Neurological:  · Alert and oriented  · Moves all extremities well  · No abnormalities of mood, affect, memory, mentation, or behavior are noted    DATA:    ECG 10/11/2021:  SR HR 69    Stress echo 4/2018:  Normal exercise stress ECHO. Echo 9/3/2013:  Normal left ventricle size, wall thickness and systolic function with an    estimated ejection fraction of 55%. No regional wall motion abnormalities    are seen.    Mitral valve is structurally normal.    Mild-moderate mitral regurgitation is present.    There is mild tricuspid regurgitation with RVSP estimated at 35 mmHg.    The left atrium is mildly dilated. LHC 9/2013 (Minor):  Mild bridging mid LAD but o/w normal coronary arteries,  Normal LVEF     OK home  ASA   Unable top tolerate bblk or Cablk due to bradycardia  If recurrent a-fib, will refer to EP      All labs and testing reviewed. CARDIOLOGY LABS:   CBC: No results for input(s): WBC, HGB, HCT, PLT in the last 72 hours. BMP: No results for input(s): NA, K, CO2, BUN, CREATININE, LABGLOM, GLUCOSE in the last 72 hours. PT/INR: No results for input(s): PROTIME, INR in the last 72 hours. APTT:No results for input(s): APTT in the last 72 hours. FASTING LIPID PANEL:No results found for: HDL, LDLDIRECT, LDLCALC, TRIG  LIVER PROFILE:No results for input(s): AST, ALT, ALB in the last 72 hours. Assessment:   Paroxysmal atrial fibrillation: stable    -on Rythmol since 2014   -[id number] score 3 (age, gender, HTN)  Sinus bradycardia: stable   HTN: controlled    Hypothyroidism: on Synthroid   HLD  Hyperbilirubinemia    Plan:   1. Continue Eliquis, Rythmol, HCTZ   2. No roel agents due to bradycardia  3. Annual CBC and BMP (due 4/2022)   4.  Follow up in one year with  Andrea BILLS: KLAUDIA Kang-STEPHIE.   [address]  [phone]

## 2021-10-11 NOTE — PROGRESS NOTES
[address]   Electrophysiology Outpatient Note              Date:  October 11, 2021  Patient name: Denilson Cam  YOB: 1950    Primary Care physician: Milton Martinez MD    HISTORY OF PRESENT ILLNESS: The patient is a 73 y.o.  female with a history of afib, sinus bradycardia, HTN, HLD, and hypothyroidism. LHC in 9/2013 showed mild LAD bridging. Most recent echo in 9/2013 showed an EF of 55%. Was started on Rythmol in 2014. Stress echo in 4/2018 was normal.     Today she is being seen for afib. EKG shows SR with a HR of 69. Patient complains of arm pain. Denies chest pain, palpitations, shortness of breath, and dizziness. Past Medical History:   has a past medical history of A-fib ([address] Hyperlipidemia, and Hypothyroid. Past Surgical History:   has a past surgical history that includes Appendectomy; Carpal tunnel release; hernia repair; and knee surgery. Home Medications:    Prior to Admission medications    Medication Sig Start Date End Date Taking?  Authorizing Provider   potassium chloride (MICRO-K) 10 MEQ extended release capsule TAKE 1 CAPSULE TWICE DAILY 5/11/21  Yes Salina Aguilera MD   apixaban (ELIQUIS) 5 MG TABS tablet TAKE 1 TABLET TWICE DAILY 5/3/21  Yes Salina Aguilera MD   hydroCHLOROthiazide (HYDRODIURIL) 25 MG tablet TAKE 1 TABLET EVERY DAY 12/18/20  Yes KLAUDIA Allen CNP   propafenone (RYTHMOL SR) 225 MG extended release capsule TAKE 1 CAPSULE EVERY 12 HOURS 10/6/20  Yes Salina Aguilera MD   Cholecalciferol (VITAMIN D3) 50 MCG (2000 UT) CAPS Take by mouth   Yes Historical Provider, MD   nitroGLYCERIN (NITROSTAT) 0.4 MG SL tablet Place 1 tablet under the tongue every 5 minutes as needed for Chest pain 3/5/18  Yes KLAUDIA Rutledge CNP   lovastatin (MEVACOR) 40 MG tablet Take 1 tablet by mouth nightly 8/2/16  Yes Jacob Malik MD   levothyroxine (SYNTHROID) 150 MCG tablet 137 mcg Daily Pt reporting she takes 137mcg 5 days a week 11/1/15  Yes Historical Provider, MD   zolpidem (AMBIEN) 5 MG tablet Take 5 mg by mouth nightly as needed for Sleep (3 X weekly)    Yes Historical Provider, MD   Omega-3 Fatty Acids (OMEGA 3 PO) Take 1,200 mg by mouth 3 times daily    Yes Historical Provider, MD   dicyclomine (BENTYL) 10 MG capsule Take 1 capsule by mouth every 6 hours as needed (cramps)  Patient not taking: Reported on 10/5/2020 7/22/20   KLAUDIA Wilson CNP   ondansetron (ZOFRAN ODT) 4 MG disintegrating tablet Take 1 tablet by mouth every 8 hours as needed for Nausea  Patient not taking: Reported on 10/5/2020 7/22/20   KLAUDIA Wilson CNP       Allergies:  Patient has no known allergies. Social History:   reports that she has never smoked. She has never used smokeless tobacco. She reports current alcohol use. She reports that she does not use drugs. Family History: family history is not on file. Review of Systems   Constitutional: Negative. HENT: Negative. Eyes: Negative. Respiratory: Negative. Cardiovascular: see HPI  Gastrointestinal: Negative. Genitourinary: Negative. Musculoskeletal: + arm pain   Skin: Negative. Neurological: Negative. Hematological: Negative. Psychiatric/Behavioral: Negative. PHYSICAL EXAM:    Vital signs:    /68   Pulse 71   Ht 5' 1\" (1.549 m)   Wt 156 lb (70.8 kg)   SpO2 98%   BMI 29.48 kg/m²      Constitutional and general appearance: alert, cooperative, no distress and appears stated age  [de-identified]: PERRL, no cervical lymphadenopathy. No masses palpable.  Normal oral mucosa  Respiratory:  · Normal excursion and expansion without use of accessory muscles  · Resp auscultation: Normal breath sounds without wheezing, rhonchi, and rales  Cardiovascular:  · The apical impulse is not displaced  · Heart tones are crisp and normal. regular S1 and S2.  · Jugular venous pulsation Normal  · The carotid upstroke is normal in amplitude and contour without delay or bruit  · Peripheral pulses are symmetrical and full   Abdomen:  · No masses or tenderness  · Bowel sounds present  Extremities:  ·  No cyanosis or clubbing  ·  No lower extremity edema  ·  Skin: warm and dry  Neurological:  · Alert and oriented  · Moves all extremities well  · No abnormalities of mood, affect, memory, mentation, or behavior are noted    DATA:    ECG 10/11/2021:  SR HR 69    Stress echo 4/2018:  Normal exercise stress ECHO. Echo 9/3/2013:  Normal left ventricle size, wall thickness and systolic function with an    estimated ejection fraction of 55%. No regional wall motion abnormalities    are seen.    Mitral valve is structurally normal.    Mild-moderate mitral regurgitation is present.    There is mild tricuspid regurgitation with RVSP estimated at 35 mmHg.    The left atrium is mildly dilated. LHC 9/2013 (Minor):  Mild bridging mid LAD but o/w normal coronary arteries,  Normal LVEF     OK home  ASA   Unable top tolerate bblk or Cablk due to bradycardia  If recurrent a-fib, will refer to EP      All labs and testing reviewed. CARDIOLOGY LABS:   CBC: No results for input(s): WBC, HGB, HCT, PLT in the last 72 hours. BMP: No results for input(s): NA, K, CO2, BUN, CREATININE, LABGLOM, GLUCOSE in the last 72 hours. PT/INR: No results for input(s): PROTIME, INR in the last 72 hours. APTT:No results for input(s): APTT in the last 72 hours. FASTING LIPID PANEL:No results found for: HDL, LDLDIRECT, LDLCALC, TRIG  LIVER PROFILE:No results for input(s): AST, ALT, ALB in the last 72 hours. Assessment:   Paroxysmal atrial fibrillation: stable    -on Rythmol since 2014   -[id number] score 3 (age, gender, HTN)  Sinus bradycardia: stable   HTN: controlled    Hypothyroidism: on Synthroid   HLD  Hyperbilirubinemia    Plan:   1. Continue Eliquis, Rythmol, HCTZ   2. No roel agents due to bradycardia  3. Annual CBC and BMP (due 4/2022)   4.  Follow up in one year with  Andrea      MDM: moderate     KLAUDIA Yang-CNP.   [facility]  [phone]

## 2021-12-15 DIAGNOSIS — R00.2 PALPITATION: ICD-10-CM

## 2021-12-15 DIAGNOSIS — I48.0 PAROXYSMAL ATRIAL FIBRILLATION (HCC): ICD-10-CM

## 2021-12-15 DIAGNOSIS — I10 ESSENTIAL HYPERTENSION: Primary | ICD-10-CM

## 2021-12-17 RX ORDER — HYDROCHLOROTHIAZIDE 25 MG/1
TABLET ORAL
Qty: 90 TABLET | Refills: 3 | Status: SHIPPED | OUTPATIENT
Start: 2021-12-17

## 2021-12-17 NOTE — TELEPHONE ENCOUNTER
Due for labs (BMP) - can have completed with labs per Edgard Kim CNP in April. Patient has only been following with EP. Have them address future refills or defer BP meds/ treatment to PCP.    Thank you, Vee Rodriguez

## 2022-03-14 DIAGNOSIS — I48.0 PAROXYSMAL ATRIAL FIBRILLATION (HCC): ICD-10-CM

## 2022-03-15 ENCOUNTER — TELEPHONE (OUTPATIENT)
Dept: CARDIOLOGY CLINIC | Age: 72
End: 2022-03-15

## 2022-03-15 RX ORDER — POTASSIUM CHLORIDE 750 MG/1
CAPSULE, EXTENDED RELEASE ORAL
Qty: 180 CAPSULE | Refills: 3 | Status: SHIPPED | OUTPATIENT
Start: 2022-03-15

## 2022-03-15 NOTE — TELEPHONE ENCOUNTER
Patient having a lipoma removed. She needs to hold eliquis, and was told by the surgeon to ask JOBY how long she needed to hold the med and to make sure she was safe to do so.

## 2022-03-18 NOTE — TELEPHONE ENCOUNTER
Spoke with pt, relayed message from [facility]. Pt stated that the surgeon told her to hold eliquis for 2 days prior to procedure.

## 2022-06-06 ENCOUNTER — OFFICE VISIT (OUTPATIENT)
Dept: SURGERY | Age: 72
End: 2022-06-06
Payer: MEDICARE

## 2022-06-06 VITALS
WEIGHT: 154 LBS | DIASTOLIC BLOOD PRESSURE: 87 MMHG | BODY MASS INDEX: 29.07 KG/M2 | HEIGHT: 61 IN | SYSTOLIC BLOOD PRESSURE: 134 MMHG | HEART RATE: 64 BPM

## 2022-06-06 DIAGNOSIS — L76.31 POSTOPERATIVE HEMATOMA OF SUBCUTANEOUS TISSUE FOLLOWING DERMATOLOGIC PROCEDURE: Primary | ICD-10-CM

## 2022-06-06 PROCEDURE — 1123F ACP DISCUSS/DSCN MKR DOCD: CPT | Performed by: SURGERY

## 2022-06-06 PROCEDURE — G8417 CALC BMI ABV UP PARAM F/U: HCPCS | Performed by: SURGERY

## 2022-06-06 PROCEDURE — 1090F PRES/ABSN URINE INCON ASSESS: CPT | Performed by: SURGERY

## 2022-06-06 PROCEDURE — G8427 DOCREV CUR MEDS BY ELIG CLIN: HCPCS | Performed by: SURGERY

## 2022-06-06 PROCEDURE — G8400 PT W/DXA NO RESULTS DOC: HCPCS | Performed by: SURGERY

## 2022-06-06 PROCEDURE — 1036F TOBACCO NON-USER: CPT | Performed by: SURGERY

## 2022-06-06 PROCEDURE — 99203 OFFICE O/P NEW LOW 30 MIN: CPT | Performed by: SURGERY

## 2022-06-06 PROCEDURE — 3017F COLORECTAL CA SCREEN DOC REV: CPT | Performed by: SURGERY

## 2022-06-29 NOTE — PROGRESS NOTES
PATIENT NAME: Jose Luis Sharma OF BIRTH: 1950     TODAY'S DATE: 6/29/2022    Reason for Visit:  Left hand cyst     Requesting Physician:  Dr. Thor Ivan:              The patient is a 73 y.o. female with a PMHx as delineated below who presents with a swelling over her left hand at the site of a recent lipoma excision. The patient has noted swelling under the incision and has suspected a recurrence of the lipoma. She did have bruising over this area.      Chief Complaint   Patient presents with   [facility] Patient     cyst on hand        REVIEW OF SYSTEMS:  CONSTITUTIONAL:  negative  HEENT:  negative  RESPIRATORY:  negative  CARDIOVASCULAR:  negative  GASTROINTESTINAL:  negative  GENITOURINARY:  negative  HEMATOLOGIC/LYMPHATIC:  negative  MUSCULOSKELETAL: negative  NEUROLOGICAL:  negative    PMH  Past Medical History:   Diagnosis Date    [facility]     one time \"years ago\", due to hypothyroidism     Hyperlipidemia     Hypothyroid        PSH  Past Surgical History:   Procedure Laterality Date    APPENDECTOMY      CARPAL TUNNEL RELEASE      HERNIA REPAIR      KNEE SURGERY         Social History  Social History     Socioeconomic History    Marital status:      Spouse name: Not on file    Number of children: Not on file    Years of education: Not on file    Highest education level: Not on file   Occupational History    Not on file   Tobacco Use    Smoking status: Never Smoker    Smokeless tobacco: Never Used   Vaping Use    Vaping Use: Never used   Substance and Sexual Activity    Alcohol use: Yes     Comment: occasional     Drug use: No    Sexual activity: Not on file   Other Topics Concern    Not on file   Social History Narrative    Not on file     Social Determinants of Health     Financial Resource Strain:     Difficulty of Paying Living Expenses: Not on file   Food Insecurity:     Worried About Running Out of Food in the Last Year: Not on file   Thomas Ran Out of Food in the Last Year: Not on file   Transportation Needs:     Lack of Transportation (Medical): Not on file    Lack of Transportation (Non-Medical): Not on file   Physical Activity:     Days of Exercise per Week: Not on file    Minutes of Exercise per Session: Not on file   Stress:     Feeling of Stress : Not on file   Social Connections:     Frequency of Communication with Friends and Family: Not on file    Frequency of Social Gatherings with Friends and Family: Not on file    Attends Church Services: Not on file    Active Member of [address] [place] [address] or Organizations: Not on file    Attends Club or Organization Meetings: Not on file    Marital Status: Not on file   Intimate Partner Violence:     Fear of Current or Ex-Partner: Not on file    Emotionally Abused: Not on file    Physically Abused: Not on file    Sexually Abused: Not on file   Housing Stability:     Unable to Pay for Housing in the Last Year: Not on file    Number of [place] in the Last Year: Not on file    Unstable Housing in the Last Year: Not on file       Family History:   No family history on file. Allergy: No Known Allergies    PHYSICAL EXAM:  VITALS:  /87   Pulse 64   Ht 5' 1\" (1.549 m)   Wt 154 lb (69.9 kg)   BMI 29.10 kg/m²     CONSTITUTIONAL:  alert, no apparent distress and normal weight  EYES:  sclera clear  ENT:  normocepalic, without obvious abnormality  NECK:  supple, symmetrical, trachea midline and no carotid bruits  LUNGS:  clear to auscultation  CARDIOVASCULAR:  regular rate and rhythm and no murmur noted  ABDOMEN:  no scars, normal bowel sounds, soft, non-distended, non-tender, voluntary guarding absent, no masses palpated and hernia absent  MUSCULOSKELETAL:  0+ pitting edema lower extremities  NEUROLOGIC:  Mental Status Exam:  Level of Alertness:   awake  Orientation:   person, place, time  SKIN:  Over the left hand, there is a 2-3 cm soft tissue fullness without clear boarders.  No erythema noted. Slight bruising noted. No skin breakdown. IMPRESSION/RECOMMENDATIONS:    The patient has a swelling from her previous lipoma excision. This most likely represents a resolving post-op hematoma. There is no evidence of infection or skin necrosis. No intervention is required and this should resolve with time.      Junie Bence, MD

## 2022-08-30 ENCOUNTER — APPOINTMENT (OUTPATIENT)
Dept: GENERAL RADIOLOGY | Age: 72
DRG: 313 | End: 2022-08-30
Payer: MEDICARE

## 2022-08-30 ENCOUNTER — HOSPITAL ENCOUNTER (INPATIENT)
Age: 72
LOS: 1 days | Discharge: HOME | DRG: 313 | End: 2022-09-01
Attending: EMERGENCY MEDICINE | Admitting: INTERNAL MEDICINE
Payer: MEDICARE

## 2022-08-30 DIAGNOSIS — R07.9 CHEST PAIN, UNSPECIFIED TYPE: Primary | ICD-10-CM

## 2022-08-30 LAB
A/G RATIO: 1.6 (ref 1.1–2.2)
ALBUMIN SERPL-MCNC: 4.7 G/DL (ref 3.4–5)
ALP BLD-CCNC: 90 U/L (ref 40–129)
ALT SERPL-CCNC: 37 U/L (ref 10–40)
ANION GAP SERPL CALCULATED.3IONS-SCNC: 10 MMOL/L (ref 3–16)
AST SERPL-CCNC: 36 U/L (ref 15–37)
BASOPHILS ABSOLUTE: 0 K/UL (ref 0–0.2)
BASOPHILS RELATIVE PERCENT: 0.8 %
BILIRUB SERPL-MCNC: 1.7 MG/DL (ref 0–1)
BUN BLDV-MCNC: 21 MG/DL (ref 7–20)
CALCIUM SERPL-MCNC: 10.7 MG/DL (ref 8.3–10.6)
CHLORIDE BLD-SCNC: 98 MMOL/L (ref 99–110)
CO2: 27 MMOL/L (ref 21–32)
CREAT SERPL-MCNC: 0.8 MG/DL (ref 0.6–1.2)
EOSINOPHILS ABSOLUTE: 0 K/UL (ref 0–0.6)
EOSINOPHILS RELATIVE PERCENT: 0.9 %
GFR AFRICAN AMERICAN: >60
GFR NON-AFRICAN AMERICAN: >60
GLUCOSE BLD-MCNC: 154 MG/DL (ref 70–99)
HCT VFR BLD CALC: 42.4 % (ref 36–48)
HEMOGLOBIN: 14.6 G/DL (ref 12–16)
LYMPHOCYTES ABSOLUTE: 0.3 K/UL (ref 1–5.1)
LYMPHOCYTES RELATIVE PERCENT: 6.6 %
MCH RBC QN AUTO: 30.3 PG (ref 26–34)
MCHC RBC AUTO-ENTMCNC: 34.3 G/DL (ref 31–36)
MCV RBC AUTO: 88.2 FL (ref 80–100)
MONOCYTES ABSOLUTE: 0.7 K/UL (ref 0–1.3)
MONOCYTES RELATIVE PERCENT: 14 %
NEUTROPHILS ABSOLUTE: 3.8 K/UL (ref 1.7–7.7)
NEUTROPHILS RELATIVE PERCENT: 77.7 %
PDW BLD-RTO: 14.8 % (ref 12.4–15.4)
PLATELET # BLD: 233 K/UL (ref 135–450)
PMV BLD AUTO: 8.4 FL (ref 5–10.5)
POTASSIUM SERPL-SCNC: 3.5 MMOL/L (ref 3.5–5.1)
RBC # BLD: 4.81 M/UL (ref 4–5.2)
SODIUM BLD-SCNC: 135 MMOL/L (ref 136–145)
TOTAL PROTEIN: 7.7 G/DL (ref 6.4–8.2)
TROPONIN: <0.01 NG/ML
WBC # BLD: 4.8 K/UL (ref 4–11)

## 2022-08-30 PROCEDURE — 85025 COMPLETE CBC W/AUTO DIFF WBC: CPT

## 2022-08-30 PROCEDURE — 71046 X-RAY EXAM CHEST 2 VIEWS: CPT

## 2022-08-30 PROCEDURE — 84484 ASSAY OF TROPONIN QUANT: CPT

## 2022-08-30 PROCEDURE — 80053 COMPREHEN METABOLIC PANEL: CPT

## 2022-08-30 PROCEDURE — 6370000000 HC RX 637 (ALT 250 FOR IP): Performed by: INTERNAL MEDICINE

## 2022-08-30 PROCEDURE — 2580000003 HC RX 258: Performed by: INTERNAL MEDICINE

## 2022-08-30 PROCEDURE — 6370000000 HC RX 637 (ALT 250 FOR IP): Performed by: EMERGENCY MEDICINE

## 2022-08-30 PROCEDURE — 99285 EMERGENCY DEPT VISIT HI MDM: CPT

## 2022-08-30 PROCEDURE — G0378 HOSPITAL OBSERVATION PER HR: HCPCS

## 2022-08-30 PROCEDURE — 93005 ELECTROCARDIOGRAM TRACING: CPT | Performed by: EMERGENCY MEDICINE

## 2022-08-30 RX ORDER — ACETAMINOPHEN 650 MG/1
650 SUPPOSITORY RECTAL EVERY 4 HOURS PRN
Status: DISCONTINUED | OUTPATIENT
Start: 2022-08-30 | End: 2022-09-01 | Stop reason: HOSPADM

## 2022-08-30 RX ORDER — LEVOTHYROXINE SODIUM 112 UG/1
112 TABLET ORAL DAILY
Status: DISCONTINUED | OUTPATIENT
Start: 2022-08-31 | End: 2022-09-01 | Stop reason: HOSPADM

## 2022-08-30 RX ORDER — SODIUM CHLORIDE 9 MG/ML
INJECTION, SOLUTION INTRAVENOUS CONTINUOUS
Status: DISCONTINUED | OUTPATIENT
Start: 2022-08-30 | End: 2022-09-01

## 2022-08-30 RX ORDER — ATORVASTATIN CALCIUM 10 MG/1
20 TABLET, FILM COATED ORAL NIGHTLY
Status: DISCONTINUED | OUTPATIENT
Start: 2022-08-30 | End: 2022-08-30

## 2022-08-30 RX ORDER — ASPIRIN 325 MG
325 TABLET ORAL ONCE
Status: COMPLETED | OUTPATIENT
Start: 2022-08-30 | End: 2022-08-30

## 2022-08-30 RX ORDER — SODIUM CHLORIDE 0.9 % (FLUSH) 0.9 %
10 SYRINGE (ML) INJECTION EVERY 12 HOURS SCHEDULED
Status: DISCONTINUED | OUTPATIENT
Start: 2022-08-30 | End: 2022-09-01 | Stop reason: HOSPADM

## 2022-08-30 RX ORDER — SODIUM CHLORIDE 9 MG/ML
INJECTION, SOLUTION INTRAVENOUS PRN
Status: DISCONTINUED | OUTPATIENT
Start: 2022-08-30 | End: 2022-09-01 | Stop reason: HOSPADM

## 2022-08-30 RX ORDER — SODIUM CHLORIDE 0.9 % (FLUSH) 0.9 %
10 SYRINGE (ML) INJECTION PRN
Status: DISCONTINUED | OUTPATIENT
Start: 2022-08-30 | End: 2022-09-01 | Stop reason: HOSPADM

## 2022-08-30 RX ORDER — PROPAFENONE HYDROCHLORIDE 225 MG/1
225 CAPSULE, EXTENDED RELEASE ORAL 2 TIMES DAILY
Status: DISCONTINUED | OUTPATIENT
Start: 2022-08-30 | End: 2022-09-01 | Stop reason: HOSPADM

## 2022-08-30 RX ORDER — ZOLPIDEM TARTRATE 5 MG/1
5 TABLET ORAL NIGHTLY PRN
Status: DISCONTINUED | OUTPATIENT
Start: 2022-08-30 | End: 2022-09-01 | Stop reason: HOSPADM

## 2022-08-30 RX ORDER — POLYETHYLENE GLYCOL 3350 17 G/17G
17 POWDER, FOR SOLUTION ORAL DAILY PRN
Status: DISCONTINUED | OUTPATIENT
Start: 2022-08-30 | End: 2022-09-01 | Stop reason: HOSPADM

## 2022-08-30 RX ORDER — ONDANSETRON 2 MG/ML
4 INJECTION INTRAMUSCULAR; INTRAVENOUS EVERY 4 HOURS PRN
Status: DISCONTINUED | OUTPATIENT
Start: 2022-08-30 | End: 2022-09-01 | Stop reason: HOSPADM

## 2022-08-30 RX ORDER — ACETAMINOPHEN 325 MG/1
650 TABLET ORAL EVERY 4 HOURS PRN
Status: DISCONTINUED | OUTPATIENT
Start: 2022-08-30 | End: 2022-09-01 | Stop reason: HOSPADM

## 2022-08-30 RX ORDER — NITROGLYCERIN 0.4 MG/1
0.4 TABLET SUBLINGUAL EVERY 5 MIN PRN
Status: DISCONTINUED | OUTPATIENT
Start: 2022-08-30 | End: 2022-09-01 | Stop reason: HOSPADM

## 2022-08-30 RX ORDER — ATORVASTATIN CALCIUM 40 MG/1
40 TABLET, FILM COATED ORAL NIGHTLY
Status: DISCONTINUED | OUTPATIENT
Start: 2022-08-31 | End: 2022-09-01 | Stop reason: HOSPADM

## 2022-08-30 RX ADMIN — PROPAFENONE HYDROCHLORIDE 225 MG: 225 CAPSULE, EXTENDED RELEASE ORAL at 23:42

## 2022-08-30 RX ADMIN — APIXABAN 5 MG: 5 TABLET, FILM COATED ORAL at 23:42

## 2022-08-30 RX ADMIN — SODIUM CHLORIDE, PRESERVATIVE FREE 10 ML: 5 INJECTION INTRAVENOUS at 23:44

## 2022-08-30 RX ADMIN — NITROGLYCERIN 0.4 MG: 0.4 TABLET, ORALLY DISINTEGRATING SUBLINGUAL at 19:18

## 2022-08-30 RX ADMIN — SODIUM CHLORIDE: 9 INJECTION, SOLUTION INTRAVENOUS at 23:45

## 2022-08-30 RX ADMIN — ASPIRIN 325 MG: 325 TABLET ORAL at 19:17

## 2022-08-30 ASSESSMENT — PAIN SCALES - GENERAL
PAINLEVEL_OUTOF10: 5
PAINLEVEL_OUTOF10: 7
PAINLEVEL_OUTOF10: 7

## 2022-08-30 ASSESSMENT — PAIN DESCRIPTION - DESCRIPTORS
DESCRIPTORS: TIGHTNESS
DESCRIPTORS: TIGHTNESS

## 2022-08-30 ASSESSMENT — PAIN - FUNCTIONAL ASSESSMENT
PAIN_FUNCTIONAL_ASSESSMENT: 0-10
PAIN_FUNCTIONAL_ASSESSMENT: 0-10

## 2022-08-30 ASSESSMENT — PAIN DESCRIPTION - LOCATION
LOCATION: CHEST

## 2022-08-30 NOTE — ED PROVIDER NOTES
[facility]  ED  EMERGENCY DEPARTMENT ENCOUNTER      Pt Name: Danni Escobedo  MRN: [id number]  Trevor 1950  Date of evaluation: 8/30/2022  Provider: Royce Dance, MD    CHIEF COMPLAINT       Chief Complaint   Patient presents with    Chest Pain     Patient with non radiating left chest tightness that started around 1500 today. Patient with bilateral jaw pain. +diaphoresis, -nausea, -emesis. Hx of Afib    Jaw Pain         HISTORY OF PRESENT ILLNESS   (Location/Symptom, Timing/Onset, Context/Setting, Quality, Duration, Modifying Factors, Severity)  Note limiting factors. Danni Escobedo is a 73 y.o. female with past medical history of hyperlipidemia, remote atrial fibrillation here today with chest pain. Patient states earlier today at approximately 3 PM she began to have a substernal squeezing tightness in her chest radiating up into both sides of her jaw associated with feeling sweaty and nauseous. No vomiting. No abdominal pain. Denies significant shortness of breath or cough. No hemoptysis. No syncope. Notes pain is mild to moderate. No alleviating factors. She has never had this in the past.    Lists of hospitals in the [place]    Nursing Notes were reviewed. REVIEW OF SYSTEMS    (2-9 systems for level 4, 10 or more for level 5)     Review of Systems    Please see HPI for pertinent positive and negative review of system findings. A full 10 system ROS was performed and otherwise negative.         PAST MEDICAL HISTORY     Past Medical History:   Diagnosis Date    A-fib [facility])     one time \"years ago\", due to hypothyroidism     Hyperlipidemia     Hypothyroid          SURGICAL HISTORY       Past Surgical History:   Procedure Laterality Date    APPENDECTOMY      CARPAL TUNNEL RELEASE      HERNIA REPAIR      KNEE SURGERY           CURRENT MEDICATIONS       Previous Medications    APIXABAN (ELIQUIS) 5 MG TABS TABLET    TAKE 1 TABLET TWICE DAILY    CHOLECALCIFEROL (VITAMIN D3) 50 MCG (2000 UT) CAPS    Take by mouth    HYDROCHLOROTHIAZIDE (HYDRODIURIL) 25 MG TABLET    TAKE 1 TABLET EVERY DAY    LEVOTHYROXINE (SYNTHROID) 150 MCG TABLET    137 mcg Daily Pt reporting she takes 137mcg 5 days a week    LOVASTATIN (MEVACOR) 40 MG TABLET    Take 1 tablet by mouth nightly    NITROGLYCERIN (NITROSTAT) 0.4 MG SL TABLET    Place 1 tablet under the tongue every 5 minutes as needed for Chest pain    OMEGA-3 FATTY ACIDS (OMEGA 3 PO)    Take 1,200 mg by mouth 3 times daily     POTASSIUM CHLORIDE (MICRO-K) 10 MEQ EXTENDED RELEASE CAPSULE    TAKE 1 CAPSULE TWICE DAILY    PROPAFENONE (RYTHMOL SR) 225 MG EXTENDED RELEASE CAPSULE    Take 1 capsule by mouth 2 times daily    ZOLPIDEM (AMBIEN) 5 MG TABLET    Take 5 mg by mouth nightly as needed for Sleep (3 X weekly)        ALLERGIES     Patient has no known allergies. FAMILY HISTORY     No family history on file. SOCIAL HISTORY       Social History     Socioeconomic History    Marital status:    Tobacco Use    Smoking status: Never    Smokeless tobacco: Never   Vaping Use    Vaping Use: Never used   Substance and Sexual Activity    Alcohol use: Yes     Alcohol/week: 1.0 standard drink     Types: 1 Glasses of wine per week     Comment: occasionally    Drug use: No       SCREENINGS    [place] Coma Scale  Eye Opening: Spontaneous  Best Verbal Response: Oriented  Best Motor Response: Obeys commands  Lucia Coma Scale Score: 15          PHYSICAL EXAM    (up to 7 for level 4, 8 or more for level 5)     ED Triage Vitals [08/30/22 1608]   BP Temp Temp Source Heart Rate Resp SpO2 Height Weight   (!) 144/91 99.6 °F (37.6 °C) Oral 81 16 96 % 5' 1\" (1.549 m) 165 lb (74.8 kg)       Physical Exam    General appearance:  Cooperative. No acute distress. Skin:  Warm. Dry. Eye:  Extraocular movements intact. Ears, nose, mouth and throat:  Oral mucosa moist,  Neck:  Trachea midline. Heart:  Regular rate and rhythm  Perfusion:  intact  Respiratory:  Respirations nonlabored.  Lungs clear to auscultation bilaterally. Abdominal:   Non distended. Nontender  Neurological:  Alert and oriented x 3. Moves all extremities spontaneously  Musculoskeletal:   Normal ROM, no deformities          Psychiatric:  Normal mood      DIAGNOSTIC RESULTS       Labs Reviewed   CBC WITH AUTO DIFFERENTIAL - Abnormal; Notable for the following components:       Result Value    Lymphocytes Absolute 0.3 (*)     All other components within normal limits   COMPREHENSIVE METABOLIC PANEL - Abnormal; Notable for the following components:    Sodium 135 (*)     Chloride 98 (*)     Glucose 154 (*)     BUN 21 (*)     Calcium 10.7 (*)     Total Bilirubin 1.7 (*)     All other components within normal limits   TROPONIN       Interpretation per the Radiologist below, if obtained/available at the time of this note:    XR CHEST (2 VW)   Final Result   Left basilar atelectasis. Otherwise unremarkable PA and lateral chest   radiograph. All other labs/imaging were within normal range or not returned as of this dictation. EMERGENCY DEPARTMENT COURSE and DIFFERENTIAL DIAGNOSIS/MDM:   Vitals:    Vitals:    08/30/22 1608   BP: (!) 144/91   Pulse: 81   Resp: 16   Temp: 99.6 °F (37.6 °C)   TempSrc: Oral   SpO2: 96%   Weight: 165 lb (74.8 kg)   Height: 5' 1\" (1.549 m)       EKG: Sinus rhythm rate of 86 bpm with left axis deviation T wave abnormality laterally. This is new compared to prior. Patient presents emergency department today complaining of chest pain radiating up into the jaw. Fairly typical story. Does have underlying risk factors and does have an EKG with nonspecific lateral ischemic changes. Troponin ultimately negative here but given story and concern for underlying possible cardiac etiology plan to admit to the hospital for further work-up. Patient was given an aspirin here in the emergency department. No concern for PE at present. Mack Etienne M.D., am the primary clinician of record. MDM      CONSULTS     IP CONSULT TO HOSPITALIST    Critical Care:   None    REASSESSMENT          PROCEDURE     Unless otherwise noted below, none     Procedures      FINAL IMPRESSION      1. Chest pain, unspecified type            DISPOSITION/PLAN   DISPOSITION Decision To Admit 08/30/2022 06:45:04 PM        PATIENT REFERRED TO:  No follow-up provider specified. DISCHARGE MEDICATIONS:  New Prescriptions    No medications on file     Controlled Substances Monitoring:     No flowsheet data found.     (Please note that portions of this note were completed with a voice recognition program.  Efforts were made to edit the dictations but occasionally words are mis-transcribed.)    Harrison Michele MD (electronically signed)  Attending Emergency Physician            Steven Coffman MD  09/02/22 0775

## 2022-08-31 ENCOUNTER — APPOINTMENT (OUTPATIENT)
Dept: CT IMAGING | Age: 72
DRG: 313 | End: 2022-08-31
Payer: MEDICARE

## 2022-08-31 ENCOUNTER — APPOINTMENT (OUTPATIENT)
Dept: NUCLEAR MEDICINE | Age: 72
DRG: 313 | End: 2022-08-31
Payer: MEDICARE

## 2022-08-31 PROBLEM — R14.0 ABDOMINAL BLOATING: Status: ACTIVE | Noted: 2022-08-31

## 2022-08-31 PROBLEM — R94.39 ABNORMAL CARDIOVASCULAR STRESS TEST: Status: ACTIVE | Noted: 2022-08-31

## 2022-08-31 PROBLEM — E87.6 HYPOKALEMIA: Status: ACTIVE | Noted: 2022-08-31

## 2022-08-31 LAB
ANION GAP SERPL CALCULATED.3IONS-SCNC: 11 MMOL/L (ref 3–16)
BASOPHILS ABSOLUTE: 0 K/UL (ref 0–0.2)
BASOPHILS RELATIVE PERCENT: 0.7 %
BUN BLDV-MCNC: 22 MG/DL (ref 7–20)
CALCIUM SERPL-MCNC: 9.8 MG/DL (ref 8.3–10.6)
CHLORIDE BLD-SCNC: 99 MMOL/L (ref 99–110)
CO2: 25 MMOL/L (ref 21–32)
CREAT SERPL-MCNC: 0.7 MG/DL (ref 0.6–1.2)
EKG ATRIAL RATE: 86 BPM
EKG DIAGNOSIS: NORMAL
EKG P AXIS: 32 DEGREES
EKG P-R INTERVAL: 154 MS
EKG Q-T INTERVAL: 336 MS
EKG QRS DURATION: 90 MS
EKG QTC CALCULATION (BAZETT): 402 MS
EKG R AXIS: -41 DEGREES
EKG T AXIS: 111 DEGREES
EKG VENTRICULAR RATE: 86 BPM
EOSINOPHILS ABSOLUTE: 0 K/UL (ref 0–0.6)
EOSINOPHILS RELATIVE PERCENT: 1 %
GFR AFRICAN AMERICAN: >60
GFR NON-AFRICAN AMERICAN: >60
GLUCOSE BLD-MCNC: 132 MG/DL (ref 70–99)
HCT VFR BLD CALC: 38.8 % (ref 36–48)
HEMOGLOBIN: 13 G/DL (ref 12–16)
LV EF: 78 %
LVEF MODALITY: NORMAL
LYMPHOCYTES ABSOLUTE: 0.2 K/UL (ref 1–5.1)
LYMPHOCYTES RELATIVE PERCENT: 3.4 %
MAGNESIUM: 1.7 MG/DL (ref 1.8–2.4)
MCH RBC QN AUTO: 29.5 PG (ref 26–34)
MCHC RBC AUTO-ENTMCNC: 33.4 G/DL (ref 31–36)
MCV RBC AUTO: 88.1 FL (ref 80–100)
MONOCYTES ABSOLUTE: 0.8 K/UL (ref 0–1.3)
MONOCYTES RELATIVE PERCENT: 16.2 %
NEUTROPHILS ABSOLUTE: 3.8 K/UL (ref 1.7–7.7)
NEUTROPHILS RELATIVE PERCENT: 78.7 %
PDW BLD-RTO: 14.8 % (ref 12.4–15.4)
PLATELET # BLD: 185 K/UL (ref 135–450)
PMV BLD AUTO: 8.7 FL (ref 5–10.5)
POTASSIUM REFLEX MAGNESIUM: 3.4 MMOL/L (ref 3.5–5.1)
RBC # BLD: 4.4 M/UL (ref 4–5.2)
SODIUM BLD-SCNC: 135 MMOL/L (ref 136–145)
TROPONIN: <0.01 NG/ML
TROPONIN: <0.01 NG/ML
WBC # BLD: 4.8 K/UL (ref 4–11)

## 2022-08-31 PROCEDURE — 6370000000 HC RX 637 (ALT 250 FOR IP): Performed by: INTERNAL MEDICINE

## 2022-08-31 PROCEDURE — 2500000003 HC RX 250 WO HCPCS: Performed by: INTERNAL MEDICINE

## 2022-08-31 PROCEDURE — 93017 CV STRESS TEST TRACING ONLY: CPT

## 2022-08-31 PROCEDURE — 99204 OFFICE O/P NEW MOD 45 MIN: CPT | Performed by: INTERNAL MEDICINE

## 2022-08-31 PROCEDURE — 6360000002 HC RX W HCPCS: Performed by: NURSE PRACTITIONER

## 2022-08-31 PROCEDURE — 93010 ELECTROCARDIOGRAM REPORT: CPT | Performed by: INTERNAL MEDICINE

## 2022-08-31 PROCEDURE — 6360000004 HC RX CONTRAST MEDICATION: Performed by: INTERNAL MEDICINE

## 2022-08-31 PROCEDURE — 3430000000 HC RX DIAGNOSTIC RADIOPHARMACEUTICAL: Performed by: INTERNAL MEDICINE

## 2022-08-31 PROCEDURE — 96365 THER/PROPH/DIAG IV INF INIT: CPT

## 2022-08-31 PROCEDURE — 75574 CT ANGIO HRT W/3D IMAGE: CPT

## 2022-08-31 PROCEDURE — G0378 HOSPITAL OBSERVATION PER HR: HCPCS

## 2022-08-31 PROCEDURE — 96375 TX/PRO/DX INJ NEW DRUG ADDON: CPT

## 2022-08-31 PROCEDURE — 84484 ASSAY OF TROPONIN QUANT: CPT

## 2022-08-31 PROCEDURE — A9502 TC99M TETROFOSMIN: HCPCS | Performed by: INTERNAL MEDICINE

## 2022-08-31 PROCEDURE — 6360000002 HC RX W HCPCS: Performed by: INTERNAL MEDICINE

## 2022-08-31 PROCEDURE — 85025 COMPLETE CBC W/AUTO DIFF WBC: CPT

## 2022-08-31 PROCEDURE — 2580000003 HC RX 258: Performed by: INTERNAL MEDICINE

## 2022-08-31 PROCEDURE — 96366 THER/PROPH/DIAG IV INF ADDON: CPT

## 2022-08-31 PROCEDURE — 80048 BASIC METABOLIC PNL TOTAL CA: CPT

## 2022-08-31 PROCEDURE — 83735 ASSAY OF MAGNESIUM: CPT

## 2022-08-31 PROCEDURE — 78452 HT MUSCLE IMAGE SPECT MULT: CPT

## 2022-08-31 RX ORDER — MAGNESIUM SULFATE IN WATER 40 MG/ML
2000 INJECTION, SOLUTION INTRAVENOUS ONCE
Status: COMPLETED | OUTPATIENT
Start: 2022-08-31 | End: 2022-08-31

## 2022-08-31 RX ORDER — SIMETHICONE 80 MG
80 TABLET,CHEWABLE ORAL EVERY 6 HOURS PRN
Status: DISCONTINUED | OUTPATIENT
Start: 2022-08-31 | End: 2022-09-01 | Stop reason: HOSPADM

## 2022-08-31 RX ORDER — METOPROLOL TARTRATE 5 MG/5ML
5 INJECTION INTRAVENOUS
Status: COMPLETED | OUTPATIENT
Start: 2022-08-31 | End: 2022-08-31

## 2022-08-31 RX ADMIN — TETROFOSMIN 33 MILLICURIE: 1.38 INJECTION, POWDER, LYOPHILIZED, FOR SOLUTION INTRAVENOUS at 09:16

## 2022-08-31 RX ADMIN — ACETAMINOPHEN 650 MG: 325 TABLET ORAL at 11:55

## 2022-08-31 RX ADMIN — REGADENOSON 0.4 MG: 0.08 INJECTION, SOLUTION INTRAVENOUS at 09:17

## 2022-08-31 RX ADMIN — ONDANSETRON 4 MG: 2 INJECTION INTRAMUSCULAR; INTRAVENOUS at 03:26

## 2022-08-31 RX ADMIN — PROPAFENONE HYDROCHLORIDE 225 MG: 225 CAPSULE, EXTENDED RELEASE ORAL at 22:45

## 2022-08-31 RX ADMIN — SODIUM CHLORIDE, PRESERVATIVE FREE 10 ML: 5 INJECTION INTRAVENOUS at 10:56

## 2022-08-31 RX ADMIN — POTASSIUM BICARBONATE 40 MEQ: 782 TABLET, EFFERVESCENT ORAL at 05:36

## 2022-08-31 RX ADMIN — ACETAMINOPHEN 650 MG: 325 TABLET ORAL at 03:26

## 2022-08-31 RX ADMIN — IOPAMIDOL 100 ML: 755 INJECTION, SOLUTION INTRAVENOUS at 15:21

## 2022-08-31 RX ADMIN — APIXABAN 5 MG: 5 TABLET, FILM COATED ORAL at 19:58

## 2022-08-31 RX ADMIN — METOPROLOL TARTRATE 5 MG: 5 INJECTION, SOLUTION INTRAVENOUS at 14:42

## 2022-08-31 RX ADMIN — SODIUM CHLORIDE, PRESERVATIVE FREE 10 ML: 5 INJECTION INTRAVENOUS at 19:59

## 2022-08-31 RX ADMIN — ATORVASTATIN CALCIUM 40 MG: 40 TABLET, FILM COATED ORAL at 19:58

## 2022-08-31 RX ADMIN — TETROFOSMIN 11.6 MILLICURIE: 1.38 INJECTION, POWDER, LYOPHILIZED, FOR SOLUTION INTRAVENOUS at 07:25

## 2022-08-31 RX ADMIN — MAGNESIUM SULFATE HEPTAHYDRATE 2000 MG: 40 INJECTION, SOLUTION INTRAVENOUS at 10:56

## 2022-08-31 ASSESSMENT — PAIN DESCRIPTION - DESCRIPTORS
DESCRIPTORS: TIGHTNESS
DESCRIPTORS: ACHING
DESCRIPTORS: ACHING

## 2022-08-31 ASSESSMENT — PAIN DESCRIPTION - LOCATION
LOCATION: HEAD
LOCATION: CHEST
LOCATION: HEAD
LOCATION: HEAD;CHEST

## 2022-08-31 ASSESSMENT — PAIN SCALES - GENERAL
PAINLEVEL_OUTOF10: 4
PAINLEVEL_OUTOF10: 5
PAINLEVEL_OUTOF10: 8
PAINLEVEL_OUTOF10: 8
PAINLEVEL_OUTOF10: 0
PAINLEVEL_OUTOF10: 5

## 2022-08-31 ASSESSMENT — PAIN DESCRIPTION - PAIN TYPE
TYPE: ACUTE PAIN
TYPE: ACUTE PAIN

## 2022-08-31 NOTE — ED NOTES
Pt provided water and crackers with peanut butter per request.      Nadine Velásquez RN  08/30/22 [id number]

## 2022-08-31 NOTE — PROGRESS NOTES
Nurse to nurse report given to Caroline STARK from [facility]. Patient is currently in [facility] for testing.

## 2022-08-31 NOTE — ED NOTES
Pt ambulated to bathroom. Steady gait. Pt aware of delay in ready admit bed. Denies needs at present.       Arlen Smallwood RN  08/30/22 [id number]

## 2022-08-31 NOTE — PROGRESS NOTES
Hospitalist Progress Note      PCP: Pernell Nissen, MD    Date of Admission: 8/30/2022    Chief Complaint: c/o chest pain; abdominal discomfort and bloating    Hospital Course: 73y.o. year-old female with a history of hypertension, hyperlipidemia, A fib and hypothyroidism. She presents to the ER for evaluation of chest pain which began around 3:00 pm. She describes a gripping tightness in the center of her chest that radiates to her bilateral jaws. She has experienced diaphoresis, but has not had vomiting or nausea. The symptoms are not made better or worse with exertion. She also reports a mild, vague abdominal discomfort and bloating. In the ER her EKG had non-specific T wave changes in her anterior and lateral leads. Her troponin was not elevated. She is being admitted for further evaluation and treatment. Associated signs and symptoms do not include typical chest pain, shortness of breath, lightheaded, dizziness, edema, orthopnea, paroxysmal nocturnal dyspnea, fever or chills. No recent medication changes    Subjective: cp,       Medications:  Reviewed    Infusion Medications    sodium chloride      sodium chloride 75 mL/hr at 08/30/22 2345     Scheduled Medications    propafenone  225 mg Oral BID    levothyroxine  112 mcg Oral Daily    apixaban  5 mg Oral BID    sodium chloride flush  10 mL IntraVENous 2 times per day    atorvastatin  40 mg Oral Nightly     PRN Meds: simethicone, nitroGLYCERIN, zolpidem, sodium chloride flush, sodium chloride, ondansetron, polyethylene glycol, acetaminophen **OR** acetaminophen    No intake or output data in the 24 hours ending 08/31/22 1345    Physical Exam Performed:    /85   Pulse 70   Temp 98.2 °F (36.8 °C) (Oral)   Resp 20   Ht 5' 1\" (1.549 m)   Wt 156 lb 8 oz (71 kg)   SpO2 98%   BMI 29.57 kg/m²     General appearance: No apparent distress, appears stated age and cooperative. HEENT: Pupils equal, round, and reactive to light. Conjunctivae/corneas clear. Neck: Supple, with full range of motion. No jugular venous distention. Trachea midline. Respiratory:  Normal respiratory effort. Clear to auscultation, bilaterally without Rales/Wheezes/Rhonchi. Cardiovascular: Regular rate and rhythm with normal S1/S2 without murmurs, rubs or gallops. Abdomen: Soft, non-tender, non-distended with normal bowel sounds. Musculoskeletal: No clubbing, cyanosis or edema bilaterally. Full range of motion without deformity. Skin: Skin color, texture, turgor normal.  No rashes or lesions. Neurologic:  Neurovascularly intact without any focal sensory/motor deficits. Cranial nerves: II-XII intact, grossly non-focal.  Psychiatric: Alert and oriented, thought content appropriate, normal insight  Capillary Refill: Brisk, 3 seconds, normal   Peripheral Pulses: +2 palpable, equal bilaterally       Labs:   Recent Labs     08/30/22  1621 08/31/22  0434   WBC 4.8 4.8   HGB 14.6 13.0   HCT 42.4 38.8    185     Recent Labs     08/30/22  1621 08/31/22  0434   * 135*   K 3.5 3.4*   CL 98* 99   CO2 27 25   BUN 21* 22*   CREATININE 0.8 0.7   CALCIUM 10.7* 9.8     Recent Labs     08/30/22  1621   AST 36   ALT 37   BILITOT 1.7*   ALKPHOS 90     No results for input(s): INR in the last 72 hours. Recent Labs     08/30/22  1621 08/31/22  0104 08/31/22  0434   TROPONINI <0.01 <0.01 <0.01       Urinalysis:      Lab Results   Component Value Date/Time    NITRU Negative 07/22/2020 11:30 AM    WBCUA 0-2 07/22/2020 11:30 AM    RBCUA 0-2 07/22/2020 11:30 AM    BLOODU TRACE-INTACT 07/22/2020 11:30 AM    SPECGRAV <=1.005 07/22/2020 11:30 AM    GLUCOSEU Negative 07/22/2020 11:30 AM       Radiology:  NM Cardiac Stress Test Nuclear Imaging   Final Result      XR CHEST (2 VW)   Final Result   Left basilar atelectasis. Otherwise unremarkable PA and lateral chest   radiograph.                  Assessment/Plan:    Active Hospital Problems    Diagnosis     Hyperlipidemia [E78.5] Priority: High    Abdominal discomfort, bloating [R14.0]      Priority: Medium    Hypokalemia [E87.6]      Priority: Medium    Abnormal cardiovascular stress test [R94.39]      Priority: Medium    Chest pain [R07.9]      Priority: Medium    Essential hypertension [I10]     Paroxysmal atrial fibrillation (HCC) [I48.0]     Acquired hypothyroidism [E03.9]      Chest pain - Trop neg x3, Lexiscan myoview-mild inferior defect consistent with attenuation artifact, possibilty of underlying ischemic heart disease cannot be excluded-Cardiology consulted. ECG-Normal sinus rhythm, Left axis deviation,T wave abnormality, consider lateral ischemia. Cardiac CTA ordered. Paroxysmal atrial fibrillation (HCC) - controlled. Continue Propafenone and Eliquis     Essential (primary) hypertension - hold HCTZ while npo and monitor blood pressure     Hyperlipidemia - No current evidence of Rhabdomyolysis or other adverse effects. Continue statin therapy while in the hospital. Hold Freedom 3 fatty acids     Hypokalemia/Hypomag - replete      Acquired hypothyroidism - stable; continue Levothyroxine     Abdominal discomfort, bloating - mild; trial of prn Simethicone     Non-morbid obesity due to excess calories (Body mass index is 31.18 kg/m². ) - Complicating assessment and treatment. Placing patient at risk for multiple co-morbidities as well as early death and contributing to the patient's presentation.     DVT Prophylaxis: Eliquis  Diet: Diet NPO Exceptions are: Ice Chips, Sips of Water with Meds  Code Status: Full Code    PT/OT Eval Status: not indicated    Dispo - poss 8/31-9/1    Appropriate for A1 Discharge Unit: Yes      KLAUDIA Broussard - STEPHIE

## 2022-08-31 NOTE — H&P
Hospital Medicine  History and Physical    PCP: Gricelda Saxena MD  Patient Name: Selam Mcdowell    Date of Service: Pt seen/examined on 8/31/22 and placed in observation. CHIEF COMPLAINT:  Pt c/o chest pain; abdominal discomfort and bloating  HISTORY OF PRESENT ILLNESS: Pt is an 73y.o. year-old female with a history of hypertension, hyperlipidemia, A fib and hypothyroidism. She presents to the ER for evaluation of chest pain which began around 3:00 pm. She describes a gripping tightness in the center of her chest that radiates to her bilateral jaws. She has experienced diaphoresis, but has not had vomiting or nausea. The symptoms are not made better or worse with exertion. She also reports a mild, vague abdominal discomfort and bloating. In the ER her EKG had non-specific T wave changes in her anterior and lateral leads. Her troponin was not elevated. She is being admitted for further evaluation and treatment. Associated signs and symptoms do not include typical chest pain, shortness of breath, lightheaded, dizziness, edema, orthopnea, paroxysmal nocturnal dyspnea, fever or chills. No recent medication changes. Past Medical History:        Diagnosis Date    A-fib [facility])     one time \"years ago\", due to hypothyroidism     Hyperlipidemia     Hypothyroid        Past Surgical History:        Procedure Laterality Date    APPENDECTOMY      CARPAL TUNNEL RELEASE      HERNIA REPAIR      KNEE SURGERY         Allergies:  Patient has no known allergies. Medications Prior to Admission:    Prior to Admission medications    Medication Sig Start Date End Date Taking?  Authorizing Provider   potassium chloride (MICRO-K) 10 MEQ extended release capsule TAKE 1 CAPSULE TWICE DAILY 3/15/22   Clay Dietrich MD   apixaban (ELIQUIS) 5 MG TABS tablet TAKE 1 TABLET TWICE DAILY 3/15/22   Clay Dietrich MD   hydroCHLOROthiazide (HYDRODIURIL) 25 MG tablet TAKE 1 TABLET EVERY DAY 12/17/21   Emely SEQUEIRA KLAUDIA Davis CNP   propafenone (RYTHMOL SR) 225 MG extended release capsule Take 1 capsule by mouth 2 times daily 10/11/21   KLAUDIA Cardona CNP   Cholecalciferol (VITAMIN D3) 50 MCG (2000 UT) CAPS Take by mouth    Historical Provider, MD   nitroGLYCERIN (NITROSTAT) 0.4 MG SL tablet Place 1 tablet under the tongue every 5 minutes as needed for Chest pain 3/5/18   KLAUDIA Mckeon CNP   lovastatin (MEVACOR) 40 MG tablet Take 1 tablet by mouth nightly  Patient not taking: Reported on 6/6/2022 8/2/16   Lizzette Alvarado MD   levothyroxine (SYNTHROID) 150 MCG tablet 137 mcg Daily Pt reporting she takes 137mcg 5 days a week 11/1/15   Historical Provider, MD   zolpidem (AMBIEN) 5 MG tablet Take 5 mg by mouth nightly as needed for Sleep (3 X weekly)     Historical Provider, MD   Omega-3 Fatty Acids (OMEGA 3 PO) Take 1,200 mg by mouth 3 times daily     Historical Provider, MD       Family History:   Family history is negative for accelerated coronary artery disease, diabetes or malignancies. Social History:   TOBACCO:   reports that she has never smoked. She has never used smokeless tobacco.  ETOH:   reports current alcohol use of about 1.0 standard drink per week. OCCUPATION:      REVIEW OF SYSTEMS:  A full review of systems was performed and is negative except for that which appears in the HPI    Physical Exam:    Vitals: /81   Pulse 82   Temp 98.8 °F (37.1 °C) (Oral)   Resp 19   Ht 5' 1\" (1.549 m)   Wt 165 lb (74.8 kg)   SpO2 96%   BMI 31.18 kg/m²   General appearance: WD/WN 73y.o. year-old female who is alert, appears stated age and is cooperative  HEENT: Head: Normocephalic, no lesions, without obvious abnormality. Eye: Normal external eye, conjunctiva, lids cornea, PEERL. Ears: Normal external ears. Non-tender. Nose: Normal external nose, mucus membranes and septum. Pharynx: Dental Hygiene adequate. Normal buccal mucosa. Normal pharynx.   Neck: no adenopathy, no carotid bruit, no JVD, supple, symmetrical, trachea midline and thyroid not enlarged, symmetric, no tenderness/mass/nodules  Lungs: clear to auscultation bilaterally and no use of accessory muscles  Heart: regular rate and rhythm, S1, S2 normal, no murmur, click, rub or gallop and normal apical impulse  Abdomen: soft, non-tender; bowel sounds normal; no masses, no organomegaly  Extremities: extremities atraumatic, no cyanosis or edema and Homans sign is negative, no sign of DVT. Capillary Refill: Acceptable < 3 seconds   Peripheral Pulses: +3 easily felt, not easily obliterated with pressures   Skin: Skin color, texture, turgor normal. No rashes or lesions on exposed skin  Neurologic: Neurovascularly intact without any focal sensory/motor deficits. Cranial nerves: II-XII intact, grossly non-focal. Gait was not tested. Mental Status: Alert and oriented, thought content appropriate, normal insight        CBC:   Recent Labs     08/30/22  1621 08/31/22  0434   WBC 4.8 4.8   HGB 14.6 13.0    185     BMP:    Recent Labs     08/30/22  1621 08/31/22  0434   * 135*   K 3.5 3.4*   CL 98* 99   CO2 27 25   BUN 21* 22*   CREATININE 0.8 0.7   GLUCOSE 154* 132*     Troponin:   Recent Labs     08/30/22  1621 08/31/22  0104 08/31/22  0434   TROPONINI <0.01 <0.01 <0.01     PT/INR:  No results found for: PTINR  U/A:    Lab Results   Component Value Date/Time    LEUKOCYTESUR Negative 07/22/2020 11:30 AM    RBCUA 0-2 07/22/2020 11:30 AM    SPECGRAV <=1.005 07/22/2020 11:30 AM    UROBILINOGEN 0.2 07/22/2020 11:30 AM    BILIRUBINUR Negative 07/22/2020 11:30 AM    BLOODU TRACE-INTACT 07/22/2020 11:30 AM    GLUCOSEU Negative 07/22/2020 11:30 AM    PROTEINU Negative 07/22/2020 11:30 AM         RAD:   I have independently reviewed and interpreted the imaging studies below and based my recommendations to the patient on those findings.     XR CHEST (2 VW)    Result Date: 8/30/2022  EXAMINATION: TWO XRAY VIEWS OF THE CHEST 8/30/2022 1:31 pm COMPARISON: 07/22/2020 HISTORY: ORDERING SYSTEM PROVIDED HISTORY: chest pain TECHNOLOGIST PROVIDED HISTORY: Reason for exam:->chest pain FINDINGS: Atelectasis at the left lung base is noted. Lung is otherwise clear. Cardial pericardial silhouette unremarkable. No pneumothorax. No free air. No acute bony abnormality. Left basilar atelectasis. Otherwise unremarkable PA and lateral chest radiograph. EKG:   Read by ER in the absence of a Cardiologist shows non-specific T wave changes in her anterior and lateral leads. No clear ischemis changes      Assessment:   Principal Problem:    Chest pain  Active Problems:    Hyperlipidemia    Abdominal discomfort, bloating    Hypokalemia    Acquired hypothyroidism    Paroxysmal atrial fibrillation (HCC)    Essential hypertension  Resolved Problems:    * No resolved hospital problems. *      Plan:       Chest pain - Evaluation in the ER included an EKG that did not have acute ischemic changes and an initial Troponin which was not elevated. Patient will be admitted and monitored on telemetry, and we will check serial cardiac enzymes. Aspirin was started in the Emergency Room. Cardiac testing has been ordered for risk stratification. I have discussed other possible etiologies of the symptoms such as Musculoskeletal Pain and GERD, including unique presenting characteritics of each. Patient understands that if the evaluation does not show that the symptoms are secondary to ischemic changes, she will be discharged and instructed to follow up with her outpatient physician to help determine the etiology of her symptoms. Paroxysmal atrial fibrillation (HCC) - controlled. Continue Propafenone and Eliquis    Essential (primary) hypertension - hold HCTZ while npo and monitor blood pressure    Hyperlipidemia - No current evidence of Rhabdomyolysis or other adverse effects.  Continue statin therapy while in the hospital. Hold [place] 3 fatty acids    Hypokalemia - replace Potassium and recheck in the am    Acquired hypothyroidism - stable; continue Levothyroxine    Abdominal discomfort, bloating - mild; trial of prn Simethicone    Non-morbid obesity due to excess calories (Body mass index is 31.18 kg/m². ) - Complicating assessment and treatment. Placing patient at risk for multiple co-morbidities as well as early death and contributing to the patient's presentation. Code Status: Full Code  Diet: Diet NPO Exceptions are: Ice Chips, Sips of Water with Meds  DVT Prophylaxis: Eliquis    (Advanced care planning has been discussed with patient and/or responsible family member and is reflected in the code status.  Further orders associated with this have been entered if appropriate)      Disposition: Anticipate that patient will remain in the hospital for less than 2 midnights depending on further evaluation and clinical course    Please note that over 50 minutes was spent in evaluating the patient, review of records and results, discussion with staff/family, etc.      Sameer Farley MD

## 2022-08-31 NOTE — CONSULTS
[address] [facility]   Cardiovascular Evaluation    PATIENT: Willian Franklin  DATE: 2022  MRN: [id number]  CSN: [id number]  : 1950    Primary Care Doctor/Referring provider: Christine Tanner MD, Catrachito Valle MD     Reason for evaluation/Chief complaint:   Chest Pain (Patient with non radiating left chest tightness that started around 1500 today. Patient with bilateral jaw pain. +diaphoresis, -nausea, -emesis. Hx of Afib) and Jaw Pain      Subjective:    History of present illness on initial date of evaluation:   Willian Franklin is a 73 y.o. patient who presents for the evaluation of the above complaints. Patient was seen and evaluated by the medical service and underwent stress testing. The stress test was completed this morning which demonstrated an inferior defect. Cardiology has been asked to further evaluate the abnormal stress test and patient's clinical course. The patient states that she had an episode of chest tightness 1 day prior to admission. She states that the chest tightness was a moderate intensity in the left side of the chest which radiated to both jaw lines. The thumb complex was associated also with heart palpitations. The patient states that this was the first time in many years that she felt this way. She previously had an episode of atrial fibrillation with rapid ventricular response which manifested in similar symptom complex. She had undergone previous cardiovascular testing including stress testing and stress echocardiogram historically that have been normal.      Patient Active Problem List   Diagnosis    Acquired hypothyroidism    Hyperlipidemia    Hyperbilirubinemia    Paroxysmal atrial fibrillation (HCC)    Essential hypertension    Chest pain    Abdominal discomfort, bloating    Hypokalemia    Abnormal cardiovascular stress test         Cardiac Testing: I have reviewed the findings below.   EKG:  ECHO:   STRESS TEST:  CATH:  BYPASS:  VASCULAR:    Past Medical History:   has a past medical history of A-fib ([address] Arthritis, Hyperlipidemia, and Hypothyroid. Surgical History:   has a past surgical history that includes Appendectomy; Carpal tunnel release; hernia repair; and knee surgery. Social History:   reports that she has never smoked. She has never used smokeless tobacco. She reports current alcohol use of about 1.0 standard drink per week. She reports that she does not use drugs. Family History:  No evidence for sudden cardiac death or premature CAD    Medications:  Reviewed and are listed in nursing record. and/or listed below  Outpatient Medications:  Prior to Admission medications    Medication Sig Start Date End Date Taking?  Authorizing Provider   potassium chloride (MICRO-K) 10 MEQ extended release capsule TAKE 1 CAPSULE TWICE DAILY 3/15/22   Tg Eubanks MD   apixaban (ELIQUIS) 5 MG TABS tablet TAKE 1 TABLET TWICE DAILY 3/15/22   Tg Eubanks MD   hydroCHLOROthiazide (HYDRODIURIL) 25 MG tablet TAKE 1 TABLET EVERY DAY 12/17/21   KLAUDIA Nieves CNP   propafenone (RYTHMOL SR) 225 MG extended release capsule Take 1 capsule by mouth 2 times daily 10/11/21   KLAUDIA Louie CNP   Cholecalciferol (VITAMIN D3) 50 MCG (2000 UT) CAPS Take by mouth    Historical Provider, MD   nitroGLYCERIN (NITROSTAT) 0.4 MG SL tablet Place 1 tablet under the tongue every 5 minutes as needed for Chest pain 3/5/18   KLAUDIA العراقي CNP   lovastatin (MEVACOR) 40 MG tablet Take 1 tablet by mouth nightly  Patient not taking: No sig reported 8/2/16   Jeni Covarrubias MD   levothyroxine (SYNTHROID) 150 MCG tablet 112 mcg Daily Pt reporting she takes 112 mcg 11/1/15   Historical Provider, MD   zolpidem (AMBIEN) 5 MG tablet Take 5 mg by mouth nightly as needed for Sleep (3 X weekly)     Historical Provider, MD   Omega-3 Fatty Acids (OMEGA 3 PO) Take 1,200 mg by mouth 3 times daily Historical Provider, MD       In-patient schedule medications:   propafenone  225 mg Oral BID    levothyroxine  112 mcg Oral Daily    apixaban  5 mg Oral BID    sodium chloride flush  10 mL IntraVENous 2 times per day    atorvastatin  40 mg Oral Nightly         Infusion Medications:   sodium chloride      sodium chloride 75 mL/hr at 08/30/22 2243         Allergies:  Patient has no known allergies. Review of Systems:   All 14 point review of symptoms completed. Pertinent positives identified in the HPI, all other review of symptoms findings as below.      Review of Systems - History obtained from the patient  General ROS: negative for - chills, fever or night sweats  Psychological ROS: negative for - disorientation or hallucinations  Ophthalmic ROS: negative for - dry eyes, eye pain or loss of vision  ENT ROS: negative for - nasal discharge or sore throat  Allergy and Immunology ROS: negative for - hives or itchy/watery eyes  Hematological and Lymphatic ROS: negative for - jaundice or night sweats  Endocrine ROS: negative for - mood swings or temperature intolerance  Breast ROS: deferred  Respiratory ROS: negative for - hemoptysis or stridor  Gastrointestinal ROS: no abdominal pain, change in bowel habits, or black or bloody stools  Genito-Urinary ROS: no dysuria, trouble voiding, or hematuria  Musculoskeletal ROS: negative for - gait disturbance, joint pain or joint stiffness  Neurological ROS: negative for - seizures or speech problems  Dermatological ROS: negative for - rash or skin lesion changes      Physical Examination:    [unfilled]  /85   Pulse 70   Temp 98.2 °F (36.8 °C) (Oral)   Resp 20   Ht 5' 1\" (1.549 m)   Wt 156 lb 8 oz (71 kg)   SpO2 98%   BMI 29.57 kg/m²    Weight: 156 lb 8 oz (71 kg)     Wt Readings from Last 3 Encounters:   08/31/22 156 lb 8 oz (71 kg)   06/06/22 154 lb (69.9 kg)   10/11/21 156 lb (70.8 kg)     No intake or output data in the 24 hours ending 08/31/22 1340    General Appearance:  Alert, cooperative, no distress, appears stated age   Head:  Normocephalic, without obvious abnormality, atraumatic   Eyes:  PERRL, conjunctiva/corneas clear       Nose: Nares normal, no drainage or sinus tenderness   Throat: Lips, mucosa, and tongue normal   Neck: Supple, symmetrical, trachea midline, no adenopathy, thyroid: not enlarged, symmetric, no tenderness/mass/nodules, no carotid bruit or JVD       Lungs:   Clear to auscultation bilaterally, respirations unlabored   Chest Wall:  No tenderness or deformity   Heart:  Regular rhythm and normal rate; S1, S2 are normal; no murmur noted; no rub or gallop   Abdomen:   Soft, non-tender, bowel sounds active all four quadrants,  no masses, no organomegaly           Extremities: Extremities normal, atraumatic, no cyanosis or edema   Pulses: 2+ and symmetric   Skin: Skin color, texture, turgor normal, no rashes or lesions   Pysch: Normal mood and affect   Neurologic: Normal gross motor and sensory exam.         Labs  Recent Labs     08/30/22  1621 08/31/22  0434   WBC 4.8 4.8   HGB 14.6 13.0   HCT 42.4 38.8   MCV 88.2 88.1    185     Recent Labs     08/30/22  1621 08/31/22  0434   CREATININE 0.8 0.7   BUN 21* 22*   * 135*   K 3.5 3.4*   CL 98* 99   CO2 27 25     No results for input(s): INR, PROTIME in the last 72 hours. Recent Labs     08/30/22  1621 08/31/22  0104 08/31/22  0434   TROPONINI <0.01 <0.01 <0.01     Invalid input(s): PRO-BNP  No results for input(s): CHOL, LDL, HDL in the last 72 hours. Invalid input(s): TG      Imaging:  I have reviewed the below testing personally and my interpretation is below.   EKG:  rhythmLeft axis deviationT wave abnormality, consider lateral ischemiaAbnormal ECGNo previous ECGs available    CXR:      Assessment:  73 y.o. patient with:  Principal Problem:    Chest pain  Active Problems:    Hyperlipidemia    Abnormal cardiovascular stress test    Essential hypertension    Problem List Items Addressed This Visit       * (Principal) Chest pain - Primary       Plan:  1. The patient's symptoms and associated risk factors suggest an intermediate probability of ischemic heart disease as the cause for the symptoms. I recommend the patient undergo non-invasive evaluation with a cardiac CTA and calcium score. It is conceivable that the patient's stress test is a false positive. 2. The patient should be treated with these therapies unless contraindicated:   ~asa daily   ~EKG as clinically needed  3. After review of these tests, further recommendations regarding care will be given. Medical Decision Making: The following items were considered in medical decision making:  Independent review of images  Review / order clinical lab tests  Review / order radiology tests  Decision to obtain old records  Review and summation of old records as accessed through [facility] (a summary of my findings in these old records)        All questions and concerns were addressed to the patient/family. Alternatives to my treatment were discussed. The note was completed using EMR. Every effort was made to ensure accuracy; however, inadvertent computerized transcription errors may be present.     Awilda Hartman MD, [address], [facility], [address] [place] office  [phone] [facility]  8/31/2022  1:40 PM

## 2022-08-31 NOTE — CARE COORDINATION
CASE MANAGEMENT INITIAL ASSESSMENT      Reviewed chart and completed assessment with patient:bedside  Family present: none  Explained Case Management role/services. Primary contact information:[facility] Decision Maker :   Primary Decision Maker: Rama Kendrick Child - [phone]    Secondary Decision Maker: Agatha Jenkins - Spouse - [phone]          Can this person be reached and be able to respond quickly, such as within a few minutes or hours? Yes      Admit date/status:8/30/22  Diagnosis:chest pain   Is this a Readmission?:  No      Insurance:medicare   Precert required for SNF: No       3 night stay required: No    Living arrangements, Adls, care needs, prior to admission:lives in [facility] home with     Durable Medical Equipment at home:  Walker__Cane_x prn for distance_RTS__ BSC__Shower Chair__  02__ HHN__ CPAP__  BiPap__  Hospital Bed__ W/C___ Other_____    Services in the home and/or outpatient, prior to admission:none    Current PCP:[place]                                Medications Prescription coverage? yes    Transportation needs: none     PT/OT recs:none    Hospital Exemption Notification (HEN):needed for SNf    Barriers to discharge:none    Plan/comments:spoke with patient. Reported from home with . IPT[employer] and drives. Will be able to get to any follow up appts, denied any DCP needs. Stated has some arthritis and has some difficulty on steps.  Saige Escobar RN      ECOC on chart for MD signature

## 2022-08-31 NOTE — PROGRESS NOTES
4 Eyes Skin Assessment     The patient is being assess for  Admission    I agree that 2 RN's have performed a thorough Head to Toe Skin Assessment on the patient. ALL assessment sites listed below have been assessed. Areas assessed by both nurses: Arley Velazquez RN  [x]   Head, Face, and Ears   [x]   Shoulders, Back, and Chest  [x]   Arms, Elbows, and Hands   [x]   Coccyx, Sacrum, and Ischum  [x]   Legs, Feet, and Heels        Does the Patient have Skin Breakdown?   No         [place] Prevention initiated:  {YES/NO:19732}   Wound Care Orders initiated:  No      [facility] nurse consulted for Pressure Injury (Stage 3,4, Unstageable, DTI, NWPT, and Complex wounds):  No      Nurse 1 eSignature: Electronically signed by Amber Roa RN on 8/31/22 at 12:09 PM EDT    **SHARE this note so that the co-signing nurse is able to place an eSignature**    Nurse 2 eSignature: {Esignature:[id number]}

## 2022-08-31 NOTE — CONSULTS
Consult placed  Called to Naldo Billingsley   Who:Dr. Leandro Blunt,  Time:1:38 PM        Electronically signed by Vickey Manzanares on 8/31/2022 at 1:38 PM

## 2022-09-01 VITALS
OXYGEN SATURATION: 92 % | HEART RATE: 74 BPM | HEIGHT: 61 IN | RESPIRATION RATE: 16 BRPM | DIASTOLIC BLOOD PRESSURE: 78 MMHG | WEIGHT: 156.5 LBS | TEMPERATURE: 98.5 F | BODY MASS INDEX: 29.55 KG/M2 | SYSTOLIC BLOOD PRESSURE: 111 MMHG

## 2022-09-01 LAB
ANION GAP SERPL CALCULATED.3IONS-SCNC: 13 MMOL/L (ref 3–16)
BASOPHILS ABSOLUTE: 0 K/UL (ref 0–0.2)
BASOPHILS RELATIVE PERCENT: 0.7 %
BUN BLDV-MCNC: 16 MG/DL (ref 7–20)
CALCIUM SERPL-MCNC: 8.8 MG/DL (ref 8.3–10.6)
CHLORIDE BLD-SCNC: 99 MMOL/L (ref 99–110)
CO2: 24 MMOL/L (ref 21–32)
CREAT SERPL-MCNC: 0.6 MG/DL (ref 0.6–1.2)
EOSINOPHILS ABSOLUTE: 0 K/UL (ref 0–0.6)
EOSINOPHILS RELATIVE PERCENT: 0.9 %
GFR AFRICAN AMERICAN: >60
GFR NON-AFRICAN AMERICAN: >60
GLUCOSE BLD-MCNC: 98 MG/DL (ref 70–99)
HCT VFR BLD CALC: 43 % (ref 36–48)
HEMOGLOBIN: 14.3 G/DL (ref 12–16)
LYMPHOCYTES ABSOLUTE: 0.7 K/UL (ref 1–5.1)
LYMPHOCYTES RELATIVE PERCENT: 15.3 %
MCH RBC QN AUTO: 29.7 PG (ref 26–34)
MCHC RBC AUTO-ENTMCNC: 33.2 G/DL (ref 31–36)
MCV RBC AUTO: 89.5 FL (ref 80–100)
MONOCYTES ABSOLUTE: 0.7 K/UL (ref 0–1.3)
MONOCYTES RELATIVE PERCENT: 16.2 %
NEUTROPHILS ABSOLUTE: 2.9 K/UL (ref 1.7–7.7)
NEUTROPHILS RELATIVE PERCENT: 66.9 %
PDW BLD-RTO: 14.9 % (ref 12.4–15.4)
PLATELET # BLD: 180 K/UL (ref 135–450)
PMV BLD AUTO: 8.8 FL (ref 5–10.5)
POTASSIUM REFLEX MAGNESIUM: 3.8 MMOL/L (ref 3.5–5.1)
RBC # BLD: 4.8 M/UL (ref 4–5.2)
SODIUM BLD-SCNC: 136 MMOL/L (ref 136–145)
WBC # BLD: 4.4 K/UL (ref 4–11)

## 2022-09-01 PROCEDURE — 36415 COLL VENOUS BLD VENIPUNCTURE: CPT

## 2022-09-01 PROCEDURE — 6370000000 HC RX 637 (ALT 250 FOR IP): Performed by: INTERNAL MEDICINE

## 2022-09-01 PROCEDURE — 85025 COMPLETE CBC W/AUTO DIFF WBC: CPT

## 2022-09-01 PROCEDURE — G0378 HOSPITAL OBSERVATION PER HR: HCPCS

## 2022-09-01 PROCEDURE — 80048 BASIC METABOLIC PNL TOTAL CA: CPT

## 2022-09-01 PROCEDURE — 2580000003 HC RX 258: Performed by: INTERNAL MEDICINE

## 2022-09-01 PROCEDURE — 99233 SBSQ HOSP IP/OBS HIGH 50: CPT | Performed by: INTERNAL MEDICINE

## 2022-09-01 PROCEDURE — 1200000000 HC SEMI PRIVATE

## 2022-09-01 RX ORDER — LEVOTHYROXINE SODIUM 112 UG/1
112 TABLET ORAL DAILY
Qty: 30 TABLET | Refills: 0
Start: 2022-09-02

## 2022-09-01 RX ORDER — ATORVASTATIN CALCIUM 40 MG/1
40 TABLET, FILM COATED ORAL NIGHTLY
Qty: 30 TABLET | Refills: 1 | Status: SHIPPED | OUTPATIENT
Start: 2022-09-01

## 2022-09-01 RX ADMIN — PROPAFENONE HYDROCHLORIDE 225 MG: 225 CAPSULE, EXTENDED RELEASE ORAL at 12:08

## 2022-09-01 RX ADMIN — SODIUM CHLORIDE, PRESERVATIVE FREE 10 ML: 5 INJECTION INTRAVENOUS at 09:12

## 2022-09-01 RX ADMIN — APIXABAN 5 MG: 5 TABLET, FILM COATED ORAL at 09:12

## 2022-09-01 RX ADMIN — LEVOTHYROXINE SODIUM 112 MCG: 0.11 TABLET ORAL at 05:31

## 2022-09-01 NOTE — PLAN OF CARE
Care Plans completed for discharge.  Electronically signed by Amelia Stern RN on 9/1/2022 at 12:01 PM

## 2022-09-01 NOTE — PROGRESS NOTES
Received report from C3 RN. Patient to transfer from 332 to 108.  Electronically signed by Bryson Bazan RN on 9/1/2022 at 6:15 AM

## 2022-09-01 NOTE — PLAN OF CARE
Problem: Pain  Goal: Verbalizes/displays adequate comfort level or baseline comfort level  9/1/2022 0337 by Sakina Leigh RN  Outcome: Progressing  Flowsheets (Taken 9/1/2022 1894)  Verbalizes/displays adequate comfort level or baseline comfort level:   Encourage patient to monitor pain and request assistance   Assess pain using appropriate pain scale   Administer analgesics based on type and severity of pain and evaluate response   Implement non-pharmacological measures as appropriate and evaluate response

## 2022-09-01 NOTE — PROGRESS NOTES
Pt is alert and oriented. VSS. RA. Pt denies pain and discomfort at this time. Shift assessment completed and documented. Call light within reach. Bed side table within reach. Wheels locked. Bed in lowest position. Bed check in place. Pt instructed to call out for assistance. Pt expressesed understanding & calls out appropritately. All care per orders.  Electronically signed by Angela Vigil RN on 9/1/2022 at 12:15 AM

## 2022-09-01 NOTE — PROGRESS NOTES
Hospitalist Progress Note      PCP: Jane Raya MD    Date of Admission: 8/30/2022    Chief Complaint: c/o chest pain; abdominal discomfort and bloating    Hospital Course: 73y.o. year-old female with a history of hypertension, hyperlipidemia, A fib and hypothyroidism. She presents to the ER for evaluation of chest pain which began around 3:00 pm. She describes a gripping tightness in the center of her chest that radiates to her bilateral jaws. She has experienced diaphoresis, but has not had vomiting or nausea. The symptoms are not made better or worse with exertion. She also reports a mild, vague abdominal discomfort and bloating. In the ER her EKG had non-specific T wave changes in her anterior and lateral leads. Her troponin was not elevated. She is being admitted for further evaluation and treatment. Associated signs and symptoms do not include typical chest pain, shortness of breath, lightheaded, dizziness, edema, orthopnea, paroxysmal nocturnal dyspnea, fever or chills.  No recent medication changes    Subjective: no cp/dyspnea      Medications:  Reviewed    Infusion Medications    sodium chloride      sodium chloride 75 mL/hr at 08/30/22 2345     Scheduled Medications    propafenone  225 mg Oral BID    levothyroxine  112 mcg Oral Daily    apixaban  5 mg Oral BID    sodium chloride flush  10 mL IntraVENous 2 times per day    atorvastatin  40 mg Oral Nightly     PRN Meds: simethicone, nitroGLYCERIN, zolpidem, sodium chloride flush, sodium chloride, ondansetron, polyethylene glycol, acetaminophen **OR** acetaminophen      Intake/Output Summary (Last 24 hours) at 9/1/2022 0959  Last data filed at 9/1/2022 0914  Gross per 24 hour   Intake 370 ml   Output 600 ml   Net -230 ml       Physical Exam Performed:    /78   Pulse 74   Temp 98.5 °F (36.9 °C)   Resp 16   Ht 5' 1\" (1.549 m)   Wt 156 lb 8 oz (71 kg)   SpO2 92%   BMI 29.57 kg/m²     General appearance: No apparent distress, appears stated age and cooperative. HEENT: Pupils equal, round, and reactive to light. Conjunctivae/corneas clear. Neck: Supple, with full range of motion. No jugular venous distention. Trachea midline. Respiratory:  Normal respiratory effort. Clear to auscultation, bilaterally without Rales/Wheezes/Rhonchi. Cardiovascular: Regular rate and rhythm with normal S1/S2 without murmurs, rubs or gallops. Abdomen: Soft, non-tender, non-distended with normal bowel sounds. Musculoskeletal: No clubbing, cyanosis or edema bilaterally. Full range of motion without deformity. Skin: Skin color, texture, turgor normal.  No rashes or lesions. Neurologic:  Neurovascularly intact without any focal sensory/motor deficits. Cranial nerves: II-XII intact, grossly non-focal.  Psychiatric: Alert and oriented, thought content appropriate, normal insight  Capillary Refill: Brisk, 3 seconds, normal   Peripheral Pulses: +2 palpable, equal bilaterally       Labs:   Recent Labs     08/30/22  1621 08/31/22  0434 09/01/22  0535   WBC 4.8 4.8 4.4   HGB 14.6 13.0 14.3   HCT 42.4 38.8 43.0    185 180       Recent Labs     08/30/22  1621 08/31/22  0434 09/01/22  0535   * 135* 136   K 3.5 3.4* 3.8   CL 98* 99 99   CO2 27 25 24   BUN 21* 22* 16   CREATININE 0.8 0.7 0.6   CALCIUM 10.7* 9.8 8.8       Recent Labs     08/30/22  1621   AST 36   ALT 37   BILITOT 1.7*   ALKPHOS 90       No results for input(s): INR in the last 72 hours.   Recent Labs     08/30/22  1621 08/31/22  0104 08/31/22  0434   TROPONINI <0.01 <0.01 <0.01         Urinalysis:      Lab Results   Component Value Date/Time    NITRU Negative 07/22/2020 11:30 AM    WBCUA 0-2 07/22/2020 11:30 AM    RBCUA 0-2 07/22/2020 11:30 AM    BLOODU TRACE-INTACT 07/22/2020 11:30 AM    SPECGRAV <=1.005 07/22/2020 11:30 AM    GLUCOSEU Negative 07/22/2020 11:30 AM       Radiology:  CTA CARDIAC W C STRC MORP W CONTRAST   Final Result   Scattered plaque is seen throughout the coronary arteries with mild narrowing   of the proximal LAD. No flow limiting stenosis noted. Calcium score 56. This is in the 60th percentile         NM Cardiac Stress Test Nuclear Imaging   Final Result      XR CHEST (2 VW)   Final Result   Left basilar atelectasis. Otherwise unremarkable PA and lateral chest   radiograph. Assessment/Plan:    Active Hospital Problems    Diagnosis     Hyperlipidemia [E78.5]      Priority: High    Abdominal discomfort, bloating [R14.0]      Priority: Medium    Hypokalemia [E87.6]      Priority: Medium    Abnormal cardiovascular stress test [R94.39]      Priority: Medium    Chest pain [R07.9]      Priority: Medium    Essential hypertension [I10]     Paroxysmal atrial fibrillation (HCC) [I48.0]     Acquired hypothyroidism [E03.9]      Chest pain - Trop neg x3, Lexiscan myoview-mild inferior defect consistent with attenuation artifact, possibilty of underlying ischemic heart disease cannot be excluded-Cardiology consulted. ECG-Normal sinus rhythm, Left axis deviation,T wave abnormality, consider lateral ischemia. Cardiac CTA-scattered plaque in coronary arteries, mild narrowing of prox LAD. Calcium score 65. Paroxysmal atrial fibrillation (HCC) - controlled. Continue Propafenone and Eliquis     Essential (primary) hypertension - hold HCTZ while npo and monitor blood pressure     Hyperlipidemia - No current evidence of Rhabdomyolysis or other adverse effects. Continue statin therapy while in the hospital. Hold [place] 3 fatty acids     Hypokalemia/Hypomag - replete      Acquired hypothyroidism - stable; continue Levothyroxine     Abdominal discomfort, bloating - mild; trial of prn Simethicone     Non-morbid obesity due to excess calories (Body mass index is 31.18 kg/m². ) - Complicating assessment and treatment. Placing patient at risk for multiple co-morbidities as well as early death and contributing to the patient's presentation.     DVT Prophylaxis: Eliquis  Diet: ADULT DIET; Regular; Low Fat/Low Chol/High Fiber/RONNIE  Code Status: Full Code    PT/OT Eval Status: not indicated    Dispo -anticipate 9/1    Appropriate for A1 Discharge Unit: Yes      KLAUDIA Boland CNP

## 2022-09-01 NOTE — PROGRESS NOTES
Pt d/c'd home. Removed peripheral IV and stopped bleeding. Catheter intact. Pt tolerated well. No redness noted at site. Notified [facility] and removed tele box. Reviewed d/c instructions, home meds, and  f/u information utilizing teach-back method. Scripts for atorvastatin sent to patient's preferred pharmacy. Patient verbalized understanding.  Electronically signed by Barbi Vu RN on 9/1/2022 at 12:16 PM

## 2022-09-01 NOTE — PROGRESS NOTES
[address] [facility]   Cardiovascular Evaluation    PATIENT: Tila Engle  DATE: 2022  MRN: [id number]  CSN: [id number]  : 1950    Primary Care Doctor/Referring provider: Sae Lopez MD, Maria Luisa Vasquez MD     Reason for evaluation/Chief complaint:   Chest Pain (Patient with non radiating left chest tightness that started around 1500 today. Patient with bilateral jaw pain. +diaphoresis, -nausea, -emesis. Hx of Afib) and Jaw Pain      Subjective: Cardiac CTA shows mild disease and calcium. History of present illness on initial date of evaluation:   Tila Engle is a 73 y.o. patient who presents for the evaluation of the above complaints. Patient was seen and evaluated by the medical service and underwent stress testing. The stress test was completed this morning which demonstrated an inferior defect. Cardiology has been asked to further evaluate the abnormal stress test and patient's clinical course. The patient states that she had an episode of chest tightness 1 day prior to admission. She states that the chest tightness was a moderate intensity in the left side of the chest which radiated to both jaw lines. The thumb complex was associated also with heart palpitations. The patient states that this was the first time in many years that she felt this way. She previously had an episode of atrial fibrillation with rapid ventricular response which manifested in similar symptom complex.   She had undergone previous cardiovascular testing including stress testing and stress echocardiogram historically that have been normal.      Patient Active Problem List   Diagnosis    Acquired hypothyroidism    Hyperlipidemia    Hyperbilirubinemia    Paroxysmal atrial fibrillation (HCC)    Essential hypertension    Chest pain    Abdominal discomfort, bloating    Hypokalemia    Abnormal cardiovascular stress test         Cardiac Testing: I have reviewed the findings below.  EKG:  ECHO:   STRESS TEST:  CATH:  BYPASS:  VASCULAR:    Past Medical History:   has a past medical history of A-fib ([address] Arthritis, Hyperlipidemia, and Hypothyroid. Surgical History:   has a past surgical history that includes Appendectomy; Carpal tunnel release; hernia repair; and knee surgery. Social History:   reports that she has never smoked. She has never used smokeless tobacco. She reports current alcohol use of about 1.0 standard drink per week. She reports that she does not use drugs. Family History:  No evidence for sudden cardiac death or premature CAD    Medications:  Reviewed and are listed in nursing record. and/or listed below  Outpatient Medications:  Prior to Admission medications    Medication Sig Start Date End Date Taking?  Authorizing Provider   potassium chloride (MICRO-K) 10 MEQ extended release capsule TAKE 1 CAPSULE TWICE DAILY 3/15/22   Minnie Ramirez MD   apixaban (ELIQUIS) 5 MG TABS tablet TAKE 1 TABLET TWICE DAILY 3/15/22   Minnie Ramirez MD   hydroCHLOROthiazide (HYDRODIURIL) 25 MG tablet TAKE 1 TABLET EVERY DAY 12/17/21   KLAUDIA Torres CNP   propafenone (RYTHMOL SR) 225 MG extended release capsule Take 1 capsule by mouth 2 times daily 10/11/21   KLAUDIA Cervantes CNP   Cholecalciferol (VITAMIN D3) 50 MCG (2000 UT) CAPS Take by mouth    Historical Provider, MD   nitroGLYCERIN (NITROSTAT) 0.4 MG SL tablet Place 1 tablet under the tongue every 5 minutes as needed for Chest pain 3/5/18   KLAUDIA Andrea CNP   lovastatin (MEVACOR) 40 MG tablet Take 1 tablet by mouth nightly  Patient not taking: No sig reported 8/2/16   Hart Canavan, MD   levothyroxine (SYNTHROID) 150 MCG tablet 112 mcg Daily Pt reporting she takes 112 mcg 11/1/15   Historical Provider, MD   zolpidem (AMBIEN) 5 MG tablet Take 5 mg by mouth nightly as needed for Sleep (3 X weekly)     Historical Provider, MD   Omega-3 Fatty Acids (OMEGA 3 PO) Take 1,200 mg by mouth 3 times daily     Historical Provider, MD       In-patient schedule medications:   propafenone  225 mg Oral BID    levothyroxine  112 mcg Oral Daily    apixaban  5 mg Oral BID    sodium chloride flush  10 mL IntraVENous 2 times per day    atorvastatin  40 mg Oral Nightly         Infusion Medications:   sodium chloride      sodium chloride 75 mL/hr at 08/30/22 1818         Allergies:  Patient has no known allergies. Review of Systems:   All 14 point review of symptoms completed. Pertinent positives identified in the HPI, all other review of symptoms findings as below.      Review of Systems - History obtained from the patient  General ROS: negative for - chills, fever or night sweats  Psychological ROS: negative for - disorientation or hallucinations  Ophthalmic ROS: negative for - dry eyes, eye pain or loss of vision  ENT ROS: negative for - nasal discharge or sore throat  Allergy and Immunology ROS: negative for - hives or itchy/watery eyes  Hematological and Lymphatic ROS: negative for - jaundice or night sweats  Endocrine ROS: negative for - mood swings or temperature intolerance  Breast ROS: deferred  Respiratory ROS: negative for - hemoptysis or stridor  Gastrointestinal ROS: no abdominal pain, change in bowel habits, or black or bloody stools  Genito-Urinary ROS: no dysuria, trouble voiding, or hematuria  Musculoskeletal ROS: negative for - gait disturbance, joint pain or joint stiffness  Neurological ROS: negative for - seizures or speech problems  Dermatological ROS: negative for - rash or skin lesion changes      Physical Examination:    [unfilled]  /78   Pulse 74   Temp 98.5 °F (36.9 °C)   Resp 16   Ht 5' 1\" (1.549 m)   Wt 156 lb 8 oz (71 kg)   SpO2 92%   BMI 29.57 kg/m²    Weight: 156 lb 8 oz (71 kg)     Wt Readings from Last 3 Encounters:   08/31/22 156 lb 8 oz (71 kg)   06/06/22 154 lb (69.9 kg)   10/11/21 156 lb (70.8 kg)       Intake/Output Summary (Last 24 hours) at 9/1/2022 [address]  filed at 9/1/2022 0914  Gross per 24 hour   Intake 370 ml   Output 600 ml   Net -230 ml       General Appearance:  Alert, cooperative, no distress, appears stated age   Head:  Normocephalic, without obvious abnormality, atraumatic   Eyes:  PERRL, conjunctiva/corneas clear       Nose: Nares normal, no drainage or sinus tenderness   Throat: Lips, mucosa, and tongue normal   Neck: Supple, symmetrical, trachea midline, no adenopathy, thyroid: not enlarged, symmetric, no tenderness/mass/nodules, no carotid bruit or JVD       Lungs:   Clear to auscultation bilaterally, respirations unlabored   Chest Wall:  No tenderness or deformity   Heart:  Regular rhythm and normal rate; S1, S2 are normal; no murmur noted; no rub or gallop   Abdomen:   Soft, non-tender, bowel sounds active all four quadrants,  no masses, no organomegaly           Extremities: Extremities normal, atraumatic, no cyanosis or edema   Pulses: 2+ and symmetric   Skin: Skin color, texture, turgor normal, no rashes or lesions   Pysch: Normal mood and affect   Neurologic: Normal gross motor and sensory exam.         Labs  Recent Labs     08/31/22  0434 09/01/22  0535   WBC 4.8 4.4   HGB 13.0 14.3   HCT 38.8 43.0   MCV 88.1 89.5    180       Recent Labs     08/31/22  0434 09/01/22  0535   CREATININE 0.7 0.6   BUN 22* 16   * 136   K 3.4* 3.8   CL 99 99   CO2 25 24       No results for input(s): INR, PROTIME in the last 72 hours. Recent Labs     08/30/22  1621 08/31/22  0104 08/31/22  0434   TROPONINI <0.01 <0.01 <0.01       Invalid input(s): PRO-BNP  No results for input(s): CHOL, LDL, HDL in the last 72 hours. Invalid input(s): TG      Imaging:  I have reviewed the below testing personally and my interpretation is below.   EKG:  rhythmLeft axis deviationT wave abnormality, consider lateral ischemiaAbnormal ECGNo previous ECGs available    CXR:      Assessment:  73 y.o. patient with:  Principal Problem:    Chest pain  Active Problems:    Hyperlipidemia    Abnormal cardiovascular stress test    Essential hypertension    Problem List Items Addressed This Visit       * (Principal) Chest pain - Primary       Plan:  1. The patient's calcium score and cardiac CTA demonstrates mild disease. 2.  Patient's stress test is likely a false positive  3. Recommend secondary prevention measures with aspirin 81 mg and high intensity statin therapy. 4. GDMT for risk factor control        Medical Decision Making: The following items were considered in medical decision making:  Independent review of images  Review / order clinical lab tests  Review / order radiology tests  Decision to obtain old records  Review and summation of old records as accessed through [employer] (a summary of my findings in these old records)        All questions and concerns were addressed to the patient/family. Alternatives to my treatment were discussed. The note was completed using EMR. Every effort was made to ensure accuracy; however, inadvertent computerized transcription errors may be present.     Wu Higuera MD, [address], [place], [place]  [phone] [facility]  [phone] [facility]  9/1/2022  10:44 AM

## 2022-09-01 NOTE — CARE COORDINATION
CM team was unable to get patient there IMM letter due to them being discharged. Email was sent to CHASE Dos Santos about the above statement.

## 2022-09-01 NOTE — DISCHARGE SUMMARY
Hospital Medicine Discharge Summary    Patient ID: Regis Henry      Patient's PCP: Bibiana Pickard MD    Admit Date: 8/30/2022     Discharge Date: 9/1/2022      Admitting Provider: Raul Patton MD     Discharge Provider: KLAUDIA Mills CNP     Discharge Diagnoses: Active Hospital Problems    Diagnosis     Hyperlipidemia [E78.5]      Priority: High    Abdominal discomfort, bloating [R14.0]      Priority: Medium    Hypokalemia [E87.6]      Priority: Medium    Abnormal cardiovascular stress test [R94.39]      Priority: Medium    Chest pain [R07.9]      Priority: Medium    Essential hypertension [I10]     Paroxysmal atrial fibrillation (HCC) [I48.0]     Acquired hypothyroidism [E03.9]        The patient was seen and examined on day of discharge and this discharge summary is in conjunction with any daily progress note from day of discharge. Hospital Course:     73y.o. year-old female with a history of hypertension, hyperlipidemia, A fib and hypothyroidism. She presents to the ER for evaluation of chest pain which began around 3:00 pm. She describes a gripping tightness in the center of her chest that radiates to her bilateral jaws. She has experienced diaphoresis, but has not had vomiting or nausea. The symptoms are not made better or worse with exertion. She also reports a mild, vague abdominal discomfort and bloating. In the ER her EKG had non-specific T wave changes in her anterior and lateral leads. Her troponin was not elevated. She is being admitted for further evaluation and treatment. Associated signs and symptoms do not include typical chest pain, shortness of breath, lightheaded, dizziness, edema, orthopnea, paroxysmal nocturnal dyspnea, fever or chills. No recent medication changes. Chest pain- unclear etiology, ACS ruled out.  Trop neg x3, Lexiscan myoview-mild inferior defect consistent with attenuation artifact, possibilty of underlying ischemic heart disease cannot be rashes or lesions. Neurologic:  Neurovascularly intact without any focal sensory/motor deficits. Cranial nerves: II-XII intact, grossly non-focal.  Psychiatric:  Alert and oriented, thought content appropriate, normal insight  Capillary Refill: Brisk,< 3 seconds   Peripheral Pulses: +2 palpable, equal bilaterally       Labs: For convenience and continuity at follow-up the following most recent labs are provided:      CBC:    Lab Results   Component Value Date/Time    WBC 4.4 09/01/2022 05:35 AM    HGB 14.3 09/01/2022 05:35 AM    HCT 43.0 09/01/2022 05:35 AM     09/01/2022 05:35 AM       Renal:    Lab Results   Component Value Date/Time     09/01/2022 05:35 AM    K 3.8 09/01/2022 05:35 AM    CL 99 09/01/2022 05:35 AM    CO2 24 09/01/2022 05:35 AM    BUN 16 09/01/2022 05:35 AM    CREATININE 0.6 09/01/2022 05:35 AM    CALCIUM 8.8 09/01/2022 05:35 AM         Significant Diagnostic Studies    Radiology:   CTA CARDIAC W C STRC MORP W CONTRAST   Final Result   Scattered plaque is seen throughout the coronary arteries with mild narrowing   of the proximal LAD. No flow limiting stenosis noted. Calcium score 56. This is in the 60th percentile         NM Cardiac Stress Test Nuclear Imaging   Final Result      XR CHEST (2 VW)   Final Result   Left basilar atelectasis. Otherwise unremarkable PA and lateral chest   radiograph.                 Consults:     IP CONSULT TO HOSPITALIST  IP CONSULT TO CARDIOLOGY    Disposition: Home    Condition at Discharge: Stable    Discharge Instructions/Follow-up:  See AVS    Code Status:  Prior     Activity: activity as tolerated    Diet: cardiac diet      Discharge Medications:     Discharge Medication List as of 9/1/2022 12:05 PM             Details   atorvastatin (LIPITOR) 40 MG tablet Take 1 tablet by mouth nightly, Disp-30 tablet, R-1Normal                Details   levothyroxine (SYNTHROID) 112 MCG tablet Take 1 tablet by mouth Daily, Disp-30 tablet, R-0NO PRINT Details   potassium chloride (MICRO-K) 10 MEQ extended release capsule TAKE 1 CAPSULE TWICE DAILY, Disp-180 capsule, R-3Normal      apixaban (ELIQUIS) 5 MG TABS tablet TAKE 1 TABLET TWICE DAILY, Disp-180 tablet, R-3Normal      hydroCHLOROthiazide (HYDRODIURIL) 25 MG tablet TAKE 1 TABLET EVERY DAY, Disp-90 tablet, R-3Normal      propafenone (RYTHMOL SR) 225 MG extended release capsule Take 1 capsule by mouth 2 times daily, Disp-180 capsule, R-3Normal      Cholecalciferol (VITAMIN D3) 50 MCG (2000 UT) CAPS Take by mouthHistorical Med      nitroGLYCERIN (NITROSTAT) 0.4 MG SL tablet Place 1 tablet under the tongue every 5 minutes as needed for Chest pain, Disp-25 tablet, R-1Normal      zolpidem (AMBIEN) 5 MG tablet Take 5 mg by mouth nightly as needed for Sleep (3 X weekly) Historical Med      Omega-3 Fatty Acids (OMEGA 3 PO) Take 1,200 mg by mouth 3 times daily Historical Med             Time Spent on discharge is more than 30 minutes in the examination, evaluation, counseling and review of medications and discharge plan. Signed:    KLAUDIA Olsen - CNP   9/9/2022      Thank you Joana Alford MD for the opportunity to be involved in this patient's care. If you have any questions or concerns, please feel free to contact me at [phone].

## 2022-09-09 NOTE — PROGRESS NOTES
Physician Progress Note      PATIENT:               Rima Reynolds  CSN #:                  [id number]  :                       1950  ADMIT DATE:       2022 6:30 PM  [address] [place] DATE:        2022 12:33 PM  RESPONDING  PROVIDER #:        Court OBRIEN          QUERY TEXT:    Pt admitted with CP. Noted documentation of \"The patient's calcium score and   cardiac CTA demonstrates mild disease. \" per cards note . If possible,   please document in progress notes and discharge summary:    The medical record reflects the following:  Risk Factors: age, HTN, abnormal stress test  Clinical Indicators: cards c/s \" The patient's calcium score and cardiac CTA   demonstrates mild disease. 2.  Patient's stress test is likely a false positive\", trops wnl  Treatment: cards c/s, labs, CTA, stress test, medication management    Thank you  Hailee Fernandez RN, CRCR, CCDS  [email or web address]  Options provided:  -- CP due to CAD  -- CP d/t please specify, pls specify. -- Other - I will add my own diagnosis  -- Disagree - Not applicable / Not valid  -- Disagree - Clinically unable to determine / Unknown  -- Refer to Clinical Documentation Reviewer    PROVIDER RESPONSE TEXT:    This pt has CP d/t Chest pain- resolved unclear etiology, poss GI or   Musculoskeletal pathology- ACS ruled out.     Query created by: Miroslava Haney on 2022 9:43 AM      Electronically signed by:  Wade Kim 2022 11:54 AM

## 2022-10-13 ENCOUNTER — TELEPHONE (OUTPATIENT)
Dept: CARDIOLOGY CLINIC | Age: 72
End: 2022-10-13

## 2022-10-13 NOTE — TELEPHONE ENCOUNTER
Patient is having anterior right total hip arthroplasty 11/09/2022. Please advise on cardiac clearance and holding blood thinner.

## 2022-10-13 NOTE — PROGRESS NOTES
Tail Doverier    Age 81 y.o.    female    1950    MRN [id number]    11/9/2022  Arrival Time_____________  OR Time____________145 Min     Procedure(s):  ANTERIOR RIGHT TOTAL HIP ARTHROPLASTY WITH FASCIAILIACA BLOCK      BIOMET                      General   Surgeon(s): Ele Woods, MD      DAY ADMIT ___  SDS/OP ___  OUTPT IN BED ___        Phone [phone] (home) [phone] (work)    PCP _____________________ Phone_________________ [address] ( ) Epic CE ( ) Appt ________    ADDITIONAL INFO __________________________________ Cardio/Consult _____________    NOTES _____________________________________________________________________    ____________________________________________________________________________    PAT APPT DATE:________ TIME: ________  FAXED QAD: _______  (__) H&P w/ Hospitalist  __________________________________________________________________________  Preop Nurse phone screen complete: _____________  (__) CBC     (__) W/ DIFF ___________     (__) Hgb A1C    ___________  (__) CHEST X RAY   __________  (__) LIPID PROFILE  ___________  (__) EKG   __________  (__) PT/PTT   ___________  (__) PFT's   __________  (__) BMP   ___________  (__) CAROTIDS  __________  (__) CMP   ___________  (__) VEIN MAPPING  __________  (__) U/A   ___________  (__) HISTORY & PHYSICAL __________  (__) URINE C & S  ___________  (__) CARDIAC CLEARANCE __________  (__) U/A W/ FLEX  ___________  (__) PULM.  CLEARANCE __________  (__) SERUM PREGNANCY ___________  (__) Check Epic DOS orders __________  (__) TYPE & SCREEN __________repeat ( ) (__)  __________________ __________  (__) ALBUMIN Laila Linger ___________  (__)  __________________ __________  (__) TRANSFERRIN  ___________  (__)  __________________ __________  (__) LIVER PROFILE  ___________  (__)  __________________ __________  (__) MRSA NASAL SWAB ___________  (__) URINE PREG DOS __________  (__) SED RATE  ___________  (__) BLOOD SUGAR DOS __________  (__) C-REACTIVE PROTEIN ___________    (__) VITAMIN D HYDROXY ___________  (__) BLOOD THINNERS __________    (__) ACE/ ARBS: _____________________     (__) BETABLOCKERS __________________

## 2022-10-13 NOTE — LETTER
[address] Cardiology - [address] [place] [address] [facility]  Phone: [phone]  Fax: [phone]    Bradley Acuna MD    [facility] [address]  1950 October 25, 2022    To whom it may concern,     Mera Duval is a low to moderate cardiovascular risk for the anticipated surgery. She may hold anticoagulation for no more than 2 days prior. If you have any questions, please call our office at [phone].      Sincerely,        Bradley Acuna MD

## 2022-10-14 ENCOUNTER — TELEPHONE (OUTPATIENT)
Dept: CARDIOLOGY CLINIC | Age: 72
End: 2022-10-14

## 2022-10-14 NOTE — TELEPHONE ENCOUNTER
Spoke with pt informed pt that once JOBY sees her in office he will talk to her about her cardiac clearance. Pt v/u.

## 2022-10-24 ENCOUNTER — TELEPHONE (OUTPATIENT)
Dept: ORTHOPEDIC SURGERY | Age: 72
End: 2022-10-24

## 2022-10-24 NOTE — TELEPHONE ENCOUNTER
ORTHOPAEDIC NURSE NAVIGATOR SUMMARY NOTE      Anticipated Date of Surgery: 11/9/22    Recieved Pre-Op Education: yes   In person class:No  Pt used educational link:Yes   Pt completed pre and post test to measure learning:Yes    If pt did not complete either, why not? N/A      PCP: Carlyle Coelho MD   Phone #: [phone]    Date of PCP Visit for H&P: 10/26/22    Any Noted Concerns from PCP prior to surgery:  No   If Yes, what concerns?:    IS THE PATIENT IN A PAIN MANAGEMENT PROGRAM?:   No     Review of Past Medical History Reveals History of:      Critical Lab Values:   Hgb/Hct:   Hemoglobin (g/dL)   Date Value   09/01/2022 14.3   /  Hematocrit (%)   Date Value   09/01/2022 43.0      HgbA1C:  No results found for: LABA1C   Albumin:    Lab Results   Component Value Date    LABALBU 4.7 08/30/2022      BUN/Cr:   BUN (mg/dL)   Date Value   09/01/2022 16   /  Creatinine (mg/dL)   Date Value   09/01/2022 0.6      BMI:    BMI Readings from Last 1 Encounters:   08/31/22 29.57 kg/m²        Coronary Artery Disease/HTN/CHF History: Yes A-lena     Cardiologist:     Cardiac Clearance Necessary: Yes    Date of Cardiac Clearance Appt: On Plavix? No,  If YES, when will they stop taking? Final Cardiac Recommendations:Cleared for surgery    -On any anticoagulation-Eliquis- to hold 2 days prior to surgery        Diabetes History: No    Most Recent HgbA1C: N/A    PCP or Endocrine Recommendations: N/A    Nutritionist/Dietician Consult Scheduled: N/A    Final Plan For Diabetic Control: N/A   Pulmonary: COPD/Emphysema/ Use of home oxygen: none     Alcohol use:        DVT Risk Stratification:  Low      Vascular Consult Ordered:  NA    Date of Vascular Appt:     Hematology/Oncology Consult Ordered:  NA    Date of Hematology/Oncology Appt:     Final Recommendation For DVT Prophylaxis:   -Smoking history or use of estrogen-         BMI Greater than 40 at time of scheduling?: No    Has Surgeon been notified of BMI concern?  Not Applicable    Weight Loss Clinic Consult Ordered: Not Applicable    Date of Wt Loss Clinic Appt:     BMI at time of surgery (if went through [facility]): Additional Medical Concerns:         Discharge Disposition Information:     Attended Pre-Hab Program: no     Anticipated Discharge Disposition:  Home with OP PT - pt does not want HHC- would [place] miles   Who will be with patient at home following discharge?  and 2 daughters      Equipment pt already has:  cane and walker   Bedroom on first or second floor: basement   Bathroom on first or second floor: basement   Weight bearing status: full   Pre-op ambulatory status: none   Number of entry steps: 2 steps into home.  Then staying in basement- down 15 steps   Caregiver assistance: full time    Pt plan to DC same day: no   HHC preference: Mychal Harrison RN  10/24/2022

## 2022-10-25 NOTE — TELEPHONE ENCOUNTER
Tessie Fierro MD  You 18 hours ago (2:52 PM)     Trudy Sandeep  She is at low to moderate cardiovascular risk for the anticipated surgery. She can hold anticoagulation for no more than 2 days. Letter created and faxed to [facility].

## 2022-10-31 DIAGNOSIS — I48.0 PAROXYSMAL ATRIAL FIBRILLATION (HCC): ICD-10-CM

## 2022-10-31 DIAGNOSIS — I10 ESSENTIAL HYPERTENSION: ICD-10-CM

## 2022-10-31 RX ORDER — HYDROCHLOROTHIAZIDE 25 MG/1
TABLET ORAL
Qty: 90 TABLET | Refills: 0 | OUTPATIENT
Start: 2022-10-31

## 2022-10-31 NOTE — TELEPHONE ENCOUNTER
Does not appear she follows with general cardiology since 2019. Recommend she fill with PCP or schedule follow up.  Thank you

## 2022-11-02 ENCOUNTER — OFFICE VISIT (OUTPATIENT)
Dept: CARDIOLOGY CLINIC | Age: 72
End: 2022-11-02
Payer: MEDICARE

## 2022-11-02 VITALS
WEIGHT: 153.4 LBS | SYSTOLIC BLOOD PRESSURE: 122 MMHG | DIASTOLIC BLOOD PRESSURE: 78 MMHG | HEIGHT: 61 IN | BODY MASS INDEX: 28.96 KG/M2

## 2022-11-02 DIAGNOSIS — I48.0 PAROXYSMAL ATRIAL FIBRILLATION (HCC): Primary | ICD-10-CM

## 2022-11-02 PROCEDURE — 93000 ELECTROCARDIOGRAM COMPLETE: CPT | Performed by: INTERNAL MEDICINE

## 2022-11-02 PROCEDURE — 3074F SYST BP LT 130 MM HG: CPT | Performed by: INTERNAL MEDICINE

## 2022-11-02 PROCEDURE — 99214 OFFICE O/P EST MOD 30 MIN: CPT | Performed by: INTERNAL MEDICINE

## 2022-11-02 PROCEDURE — G8427 DOCREV CUR MEDS BY ELIG CLIN: HCPCS | Performed by: INTERNAL MEDICINE

## 2022-11-02 PROCEDURE — G8484 FLU IMMUNIZE NO ADMIN: HCPCS | Performed by: INTERNAL MEDICINE

## 2022-11-02 PROCEDURE — 1090F PRES/ABSN URINE INCON ASSESS: CPT | Performed by: INTERNAL MEDICINE

## 2022-11-02 PROCEDURE — 3017F COLORECTAL CA SCREEN DOC REV: CPT | Performed by: INTERNAL MEDICINE

## 2022-11-02 PROCEDURE — 3078F DIAST BP <80 MM HG: CPT | Performed by: INTERNAL MEDICINE

## 2022-11-02 PROCEDURE — 1123F ACP DISCUSS/DSCN MKR DOCD: CPT | Performed by: INTERNAL MEDICINE

## 2022-11-02 PROCEDURE — G8417 CALC BMI ABV UP PARAM F/U: HCPCS | Performed by: INTERNAL MEDICINE

## 2022-11-02 PROCEDURE — G8400 PT W/DXA NO RESULTS DOC: HCPCS | Performed by: INTERNAL MEDICINE

## 2022-11-02 PROCEDURE — 1036F TOBACCO NON-USER: CPT | Performed by: INTERNAL MEDICINE

## 2022-11-02 NOTE — PATIENT INSTRUCTIONS
Plan:  1. Okay to proceed with scheduled surgeries  2. Continue current cardiac medications   3.  Follow up with EPNP in 1 year or sooner if needed

## 2022-11-02 NOTE — PROGRESS NOTES
[facility]   Cardiac Consultation  Date: 11/2/22  Patient Name: Tali Judge  YOB: 1950    Primary Care Physician: Reshma Kimball MD    CHIEF COMPLAINT:   Chief Complaint   Patient presents with    Follow-up    Atrial Fibrillation    Cardiac Clearance     Hip replacement 11/9/22   Cyst on hand 11/21/22       HPI:  Tali Judge is a 81 y.o. female who presents today for follow up regarding symptomatic paroxysmal atrial fibrillation. She was originally found to be in AF around 2003 when diagnosed with hyperthyroidism. She was admitted on 8/31/13 for tachycardia and was found to be in AF. She underwent an echocardiogram on 9/3/13 which demonstrated an EF of 55% with no regional wall motion abnormalities. She underwent a Left Heart Cath on 9/3/13 which demonstrated normal coronary arteries and normal LVEF. She was admitted again on 11/3/14 for AF. She has been on Rythmol since 2014 for PAF. In 12/2015 her HR was in the 40's so her metoprolol was decreased to 12.5 mg BID. On 11/4/16 her metoprolol was stopped due to bradycardia. She underwent a Stress Echo on 4/3/18 which was normal. She has previously been stable on both diltiazem and Rythmol. On 10/5/20 she reported she is doing well from a cardiac standpoint. She reported she is aware when she is in AF. She reported she is not very active at home. She does not exercise. She reported she is able to ascend a flight of stairs with no shortness of breath. On 08/30/2022 she presented to the emergency room with complaints of non-radiating chest pain with bilateral jaw pain, and diaphoresis. She completed a stress test on 08/31/2022 that demonstrated a mild inferior defect consistent with attenuation artifact. There was no evidence for ischemia. Left ventricular wall motion and function are normal. The estimated left ventricular function is 78%.  She underwent a cardiac CTA to further assess for ischemia that showed scattered plaque throughout the coronary arteries with mild narrowing of the proximal LAD. No flow limiting stenosis noted. Calcium score 56. This is in the 60th percentile. The stress test was ruled to be a false positive and the patient was initiated on GDMT for further management and prevention. Today, 11/02/2022, she presents today using a cane to ambulate. She states that she felt strange one month ago which led her to check her vitals and report to the emergency room. She reports she is having a surgery on her hip on the 9th of November and she is having ganglion cysts removed on the 21st on November. Patient denies current edema, chest pain, shortness of breath, palpitations, dizziness or syncope. Patient is taking all cardiac medications as prescribed and tolerates them well. Past Medical History:   has a past medical history of A-fib ([address] Arthritis, Hyperlipidemia, and Hypothyroid. Surgical History:   has a past surgical history that includes Appendectomy; Carpal tunnel release; hernia repair; and knee surgery. Social History:   reports that she has never smoked. She has never used smokeless tobacco. She reports current alcohol use of about 1.0 standard drink per week. She reports that she does not use drugs. Family History:  family history is not on file.      Home Medications:  Outpatient Encounter Medications as of 11/2/2022   Medication Sig Dispense Refill    levothyroxine (SYNTHROID) 112 MCG tablet Take 1 tablet by mouth Daily 30 tablet 0    atorvastatin (LIPITOR) 40 MG tablet Take 1 tablet by mouth nightly (Patient taking differently: Take 80 mg by mouth nightly) 30 tablet 1    potassium chloride (MICRO-K) 10 MEQ extended release capsule TAKE 1 CAPSULE TWICE DAILY 180 capsule 3    apixaban (ELIQUIS) 5 MG TABS tablet TAKE 1 TABLET TWICE DAILY 180 tablet 3    hydroCHLOROthiazide (HYDRODIURIL) 25 MG tablet TAKE 1 TABLET EVERY DAY 90 tablet 3    propafenone (RYTHMOL SR) 225 MG fraction of 55% and uniform myocardial segmental wall    motion. Following stress there was uniform augmentation of all myocardial    segments with appropriate hyperdynamic LV systolic response to stress. ECG    <0.5mm ST segment depression. Normal exercise stress EKG portion. Arrhythmias    No evidence for significant arrhythmia. Baseline artifact. Symptoms    There was stress induced shortness of breath. Symptoms resolved with rest.       Objective:  Physical Exam   Constitutional: She is oriented to person, place, and time. She appears well-developed and well-nourished. HENT:   Head: Normocephalic and atraumatic. Eyes: Pupils are equal, round, and reactive to light. Neck: Normal range of motion. Cardiovascular: Normal rate, regular rhythm and normal heart sounds. Pulmonary/Chest: Effort normal and breath sounds normal.   Abdominal: Soft. No tenderness. Musculoskeletal: Normal range of motion. She exhibits no edema. Neurological: She is alert and oriented to person, place, and time. Skin: Skin is warm and dry. Psychiatric: She has a normal mood and affect. Assessment:  1. PAF-she has been on propafenone since 2014 and has had excellent suppression of her atrial arrhythmias. She has tolerated this medication without difficulty and has a normal QRS complex. 2. Atypical chest pain - Cardiac CTA 09/2022 showed a CT calcium score of 56. Patient placed on gold directed medical therapy for further management and prevention. Plan:  1. Okay to proceed with scheduled surgeries   - hold Eliquis 2 days prior to the procedure  2. Continue current cardiac medications   3. Follow up with EPNP in 1 year or sooner if needed      QUALITY MEASURES  1. Tobacco Cessation Counseling: NA  2. Retake of BP if >140/90:   NA  3. Documentation to PCP/referring for new patient:  Sent to PCP at close of office visit  4. CAD patient on anti-platelet: NA  5.  CAD patient on STATIN therapy: Yes  6. Patient with CHF and aFib on anticoagulation:Yes, Eliquis. Bruce Bonilla RN, am scribing for and in the presence of Dr. Aissatou Fuentes. 11/02/22 3:10 PM   Baldo Gamble RN      I, Dr. Aissatou Fuentes, personally performed the services described in this documentation as scribed by Baldo Gamble RN in my presence, and it is both accurate and complete.        Aissatou Fuentes M.D.

## 2022-11-02 NOTE — LETTER
[address] Cardiology - [address]  Phone: [phone]  Fax: [phone]    Dian Tate MD    [address]  1950 November 2, 2022    To whom it may concern,     Riya Chauhan is a low cardiovascular risk for the planned procedure.  She may hold       Sincerely,        Dian Tate MD

## 2022-11-02 NOTE — LETTER
[address] Cardiology - [address] [place] [address] [facility]  Phone: [phone]  Fax: [phone]    Kerry Machado MD    [address] [place] [address] Drive  1950 November 2, 2022    To whom it may concern,     Jonathan Crews is a low cardiovascular risk for the scheduled procedure. She may hold Eliquis for 2 days prior to the procedure and resume as soon as possible. If you have any questions, please call our office at [phone]. Thank you.        Sincerely,        Kerry Machado MD

## 2022-11-04 ENCOUNTER — TELEPHONE (OUTPATIENT)
Dept: CARDIOLOGY CLINIC | Age: 72
End: 2022-11-04

## 2022-11-04 DIAGNOSIS — I48.0 PAROXYSMAL ATRIAL FIBRILLATION (HCC): ICD-10-CM

## 2022-11-04 RX ORDER — LANOLIN ALCOHOL/MO/W.PET/CERES
10 CREAM (GRAM) TOPICAL NIGHTLY PRN
COMMUNITY

## 2022-11-04 RX ORDER — PROPAFENONE HYDROCHLORIDE 225 MG/1
225 CAPSULE, EXTENDED RELEASE ORAL 2 TIMES DAILY
Qty: 180 CAPSULE | Refills: 0 | Status: SHIPPED | OUTPATIENT
Start: 2022-11-04 | End: 2023-02-02

## 2022-11-04 RX ORDER — ASPIRIN 81 MG/1
81 TABLET ORAL DAILY
COMMUNITY

## 2022-11-04 NOTE — PROGRESS NOTES
1. Do not eat or drink anything after 12 midnight prior to surgery. This includes no water, chewing gum mints, or ice chips. You may brush your teeth and gargle the day of surgery but DO NOT SWALLOW THE WATER. 2. Please see your family doctor/pediatrician for a history and physical and/or concerning medications. Bring any test results/reports from your physician's office. If you are under the care of a heart doctor or specialist please be aware that you may be asked to see him or her for clearance. 3. You may be asked to stop blood thinners such as Coumadin, Plavix, Fragmin, and Lovenox or Anti-inflammatories such as Aspirin, Ibuprofen, Advil, and Naproxen prior to your surgery. Please check with your doctor before stopping these or any other medications. 4. Do not smoke, and do not drink any alcoholic beverages 24 hours prior to surgery. 5. You MUST make arrangements for a responsible adult to take you home after your surgery. For your safety, you will not be allowed to leave alone or drive yourself home. Your surgery will be cancelled if you do not have a ride home. Also for your safety, it is strongly suggested someone stay with you the first 24 hrs after your surgery. 6. A parent/legal guardian must accompany a child scheduled for surgery and plan to stay at the hospital until the child is discharged. Please do not bring other children with you. 7. For your comfort,please wear simple, loose fitting clothing to the hospital.  Please do not bring valuables (money, credit cards, checkbooks, etc.) Do not wear any makeup (including no eye makeup) or nail polish on your fingers or toes. 8. For your safety, please DO NOT wear any jewelry or piercings on day of surgery. All body piercing jewelry must be removed. 9. If you have dentures, they will be removed before going to the OR; for your convenience we will provide you with a container.   If you wear contact lenses or glasses, they will be removed, they will be removed, please bring a case for them. 10. If appicable,Please see your family doctor/pediatrician for a history & physical and/or concerning medications. Bring any test results/reports from your physician's office. 11. Remember to bring Blood Bank bracelet to the hospital on the day of surgery. 12. If you have a Living Will and Durable Power of  for Healthcare, please bring in a copy. 15. Notify your Surgeon if you develop any illness between now and surgery  time, cough, cold, fever, sore throat, nausea, vomiting, etc.  Please notify your surgeon if you experience dizziness, shortness of breath or blurred vision between now & the time of your surgery   14. DO NOT shave your operative site 96 hours prior to surgery. For face & neck surgery, men may use an electric razor 48 hours prior to surgery. 15. Shower the night before surgery with _x__Antibacterial soap _x__Hibiclens   16. To provide excellent care visitors will be limited to one in the room at any given time. 17.  Please bring picture ID and insurance card. 18.  Visit our web site for additional information:  [facility]/surgery.         Ok to take levothyroxine and Rythmol DOS

## 2022-11-04 NOTE — TELEPHONE ENCOUNTER
11/04 pt called and stated she has a bottle of propafenone (RYTHMOL SR) 225 MG extended release capsule   That was filled on 10/14/21 and the bottle states to discard after 10/14/22, pt wondering if she can continue to take this medication due to there being 80 capsules left? ?  Please advise

## 2022-11-04 NOTE — TELEPHONE ENCOUNTER
Spoke with the patient and advised that the patient DO NOT take  medication. RX pended for signature.      Last OV - 2022  Last labs - 2022

## 2022-11-08 ENCOUNTER — ANESTHESIA EVENT (OUTPATIENT)
Dept: OPERATING ROOM | Age: 72
End: 2022-11-08
Payer: MEDICARE

## 2022-11-09 ENCOUNTER — APPOINTMENT (OUTPATIENT)
Dept: GENERAL RADIOLOGY | Age: 72
End: 2022-11-09
Attending: ORTHOPAEDIC SURGERY
Payer: MEDICARE

## 2022-11-09 ENCOUNTER — HOSPITAL ENCOUNTER (OUTPATIENT)
Age: 72
Setting detail: OUTPATIENT SURGERY
Discharge: HOME | End: 2022-11-09
Attending: ORTHOPAEDIC SURGERY | Admitting: ORTHOPAEDIC SURGERY
Payer: MEDICARE

## 2022-11-09 ENCOUNTER — ANESTHESIA (OUTPATIENT)
Dept: OPERATING ROOM | Age: 72
End: 2022-11-09
Payer: MEDICARE

## 2022-11-09 VITALS
DIASTOLIC BLOOD PRESSURE: 68 MMHG | WEIGHT: 154.2 LBS | HEIGHT: 61 IN | TEMPERATURE: 97 F | RESPIRATION RATE: 17 BRPM | BODY MASS INDEX: 29.11 KG/M2 | SYSTOLIC BLOOD PRESSURE: 110 MMHG | HEART RATE: 63 BPM | OXYGEN SATURATION: 98 %

## 2022-11-09 DIAGNOSIS — M16.11 PRIMARY OSTEOARTHRITIS OF RIGHT HIP: Primary | ICD-10-CM

## 2022-11-09 LAB
ABO/RH: NORMAL
ANTIBODY SCREEN: NORMAL

## 2022-11-09 PROCEDURE — 2709999900 HC NON-CHARGEABLE SUPPLY: Performed by: ORTHOPAEDIC SURGERY

## 2022-11-09 PROCEDURE — 2580000003 HC RX 258: Performed by: ORTHOPAEDIC SURGERY

## 2022-11-09 PROCEDURE — 2500000003 HC RX 250 WO HCPCS: Performed by: ORTHOPAEDIC SURGERY

## 2022-11-09 PROCEDURE — 97116 GAIT TRAINING THERAPY: CPT

## 2022-11-09 PROCEDURE — 6360000002 HC RX W HCPCS: Performed by: ORTHOPAEDIC SURGERY

## 2022-11-09 PROCEDURE — 7100000000 HC PACU RECOVERY - FIRST 15 MIN: Performed by: ORTHOPAEDIC SURGERY

## 2022-11-09 PROCEDURE — 86901 BLOOD TYPING SEROLOGIC RH(D): CPT

## 2022-11-09 PROCEDURE — 3700000001 HC ADD 15 MINUTES (ANESTHESIA): Performed by: ORTHOPAEDIC SURGERY

## 2022-11-09 PROCEDURE — 6360000002 HC RX W HCPCS: Performed by: ANESTHESIOLOGY

## 2022-11-09 PROCEDURE — 97110 THERAPEUTIC EXERCISES: CPT

## 2022-11-09 PROCEDURE — 2500000003 HC RX 250 WO HCPCS: Performed by: REGISTERED NURSE

## 2022-11-09 PROCEDURE — 2720000010 HC SURG SUPPLY STERILE: Performed by: ORTHOPAEDIC SURGERY

## 2022-11-09 PROCEDURE — 3700000000 HC ANESTHESIA ATTENDED CARE: Performed by: ORTHOPAEDIC SURGERY

## 2022-11-09 PROCEDURE — 97161 PT EVAL LOW COMPLEX 20 MIN: CPT

## 2022-11-09 PROCEDURE — 7100000001 HC PACU RECOVERY - ADDTL 15 MIN: Performed by: ORTHOPAEDIC SURGERY

## 2022-11-09 PROCEDURE — 7100000011 HC PHASE II RECOVERY - ADDTL 15 MIN: Performed by: ORTHOPAEDIC SURGERY

## 2022-11-09 PROCEDURE — 86900 BLOOD TYPING SEROLOGIC ABO: CPT

## 2022-11-09 PROCEDURE — 3209999900 FLUORO FOR SURGICAL PROCEDURES

## 2022-11-09 PROCEDURE — 7100000010 HC PHASE II RECOVERY - FIRST 15 MIN: Performed by: ORTHOPAEDIC SURGERY

## 2022-11-09 PROCEDURE — 6370000000 HC RX 637 (ALT 250 FOR IP): Performed by: ANESTHESIOLOGY

## 2022-11-09 PROCEDURE — 6360000002 HC RX W HCPCS

## 2022-11-09 PROCEDURE — 73502 X-RAY EXAM HIP UNI 2-3 VIEWS: CPT

## 2022-11-09 PROCEDURE — 72170 X-RAY EXAM OF PELVIS: CPT

## 2022-11-09 PROCEDURE — 2580000003 HC RX 258: Performed by: REGISTERED NURSE

## 2022-11-09 PROCEDURE — 97535 SELF CARE MNGMENT TRAINING: CPT

## 2022-11-09 PROCEDURE — A4217 STERILE WATER/SALINE, 500 ML: HCPCS | Performed by: ORTHOPAEDIC SURGERY

## 2022-11-09 PROCEDURE — C1776 JOINT DEVICE (IMPLANTABLE): HCPCS | Performed by: ORTHOPAEDIC SURGERY

## 2022-11-09 PROCEDURE — 6360000002 HC RX W HCPCS: Performed by: REGISTERED NURSE

## 2022-11-09 PROCEDURE — 3600000015 HC SURGERY LEVEL 5 ADDTL 15MIN: Performed by: ORTHOPAEDIC SURGERY

## 2022-11-09 PROCEDURE — 97165 OT EVAL LOW COMPLEX 30 MIN: CPT

## 2022-11-09 PROCEDURE — 3600000005 HC SURGERY LEVEL 5 BASE: Performed by: ORTHOPAEDIC SURGERY

## 2022-11-09 PROCEDURE — 86850 RBC ANTIBODY SCREEN: CPT

## 2022-11-09 DEVICE — BIOLOX® DELTA, CERAMIC FEMORAL HEAD, S, Ø 36/-3.5, TAPER 12/14
Type: IMPLANTABLE DEVICE | Site: HIP | Status: FUNCTIONAL
Brand: BIOLOX® DELTA

## 2022-11-09 DEVICE — G7 FINNED 3 HOLE SHELL 50D: Type: IMPLANTABLE DEVICE | Site: HIP | Status: FUNCTIONAL

## 2022-11-09 DEVICE — IMPLANTABLE DEVICE: Type: IMPLANTABLE DEVICE | Site: HIP | Status: FUNCTIONAL

## 2022-11-09 DEVICE — LINER ACET 36 MM HIP NEUT POLYETH G7 VIVACIT E: Type: IMPLANTABLE DEVICE | Site: HIP | Status: FUNCTIONAL

## 2022-11-09 RX ORDER — SODIUM CHLORIDE 0.9 % (FLUSH) 0.9 %
5-40 SYRINGE (ML) INJECTION EVERY 12 HOURS SCHEDULED
Status: DISCONTINUED | OUTPATIENT
Start: 2022-11-09 | End: 2022-11-09 | Stop reason: HOSPADM

## 2022-11-09 RX ORDER — OXYCODONE HYDROCHLORIDE 5 MG/1
5 TABLET ORAL PRN
Status: DISCONTINUED | OUTPATIENT
Start: 2022-11-09 | End: 2022-11-09 | Stop reason: HOSPADM

## 2022-11-09 RX ORDER — SODIUM CHLORIDE 9 MG/ML
INJECTION, SOLUTION INTRAVENOUS PRN
Status: DISCONTINUED | OUTPATIENT
Start: 2022-11-09 | End: 2022-11-09 | Stop reason: HOSPADM

## 2022-11-09 RX ORDER — ONDANSETRON 8 MG/1
4 TABLET, ORALLY DISINTEGRATING ORAL EVERY 8 HOURS PRN
Status: CANCELLED | OUTPATIENT
Start: 2022-11-09

## 2022-11-09 RX ORDER — SODIUM CHLORIDE 9 MG/ML
25 INJECTION, SOLUTION INTRAVENOUS PRN
Status: DISCONTINUED | OUTPATIENT
Start: 2022-11-09 | End: 2022-11-09 | Stop reason: HOSPADM

## 2022-11-09 RX ORDER — ACETAMINOPHEN 500 MG
1000 TABLET ORAL 3 TIMES DAILY
Qty: 540 TABLET | Refills: 1 | Status: SHIPPED | OUTPATIENT
Start: 2022-11-09

## 2022-11-09 RX ORDER — FENTANYL CITRATE 50 UG/ML
INJECTION, SOLUTION INTRAMUSCULAR; INTRAVENOUS
Status: COMPLETED
Start: 2022-11-09 | End: 2022-11-09

## 2022-11-09 RX ORDER — FENTANYL CITRATE 50 UG/ML
25 INJECTION, SOLUTION INTRAMUSCULAR; INTRAVENOUS EVERY 5 MIN PRN
Status: DISCONTINUED | OUTPATIENT
Start: 2022-11-09 | End: 2022-11-09 | Stop reason: HOSPADM

## 2022-11-09 RX ORDER — SENNA PLUS 8.6 MG/1
1 TABLET ORAL 2 TIMES DAILY
Qty: 60 TABLET | Refills: 11 | Status: SHIPPED | OUTPATIENT
Start: 2022-11-09 | End: 2023-11-09

## 2022-11-09 RX ORDER — DEXAMETHASONE SODIUM PHOSPHATE 4 MG/ML
INJECTION, SOLUTION INTRA-ARTICULAR; INTRALESIONAL; INTRAMUSCULAR; INTRAVENOUS; SOFT TISSUE PRN
Status: DISCONTINUED | OUTPATIENT
Start: 2022-11-09 | End: 2022-11-09 | Stop reason: SDUPTHER

## 2022-11-09 RX ORDER — FAMOTIDINE 20 MG/1
20 TABLET, FILM COATED ORAL 2 TIMES DAILY
Status: CANCELLED | OUTPATIENT
Start: 2022-11-09

## 2022-11-09 RX ORDER — SODIUM CHLORIDE 0.9 % (FLUSH) 0.9 %
5-40 SYRINGE (ML) INJECTION PRN
Status: CANCELLED | OUTPATIENT
Start: 2022-11-09

## 2022-11-09 RX ORDER — DEXAMETHASONE 4 MG/1
4 TABLET ORAL
Qty: 5 TABLET | Refills: 0 | Status: SHIPPED | OUTPATIENT
Start: 2022-11-09 | End: 2022-11-14

## 2022-11-09 RX ORDER — OXYCODONE HYDROCHLORIDE 5 MG/1
5 TABLET ORAL EVERY 4 HOURS PRN
Status: CANCELLED | OUTPATIENT
Start: 2022-11-09

## 2022-11-09 RX ORDER — SODIUM CHLORIDE 0.9 % (FLUSH) 0.9 %
5-40 SYRINGE (ML) INJECTION PRN
Status: DISCONTINUED | OUTPATIENT
Start: 2022-11-09 | End: 2022-11-09 | Stop reason: HOSPADM

## 2022-11-09 RX ORDER — PHENYLEPHRINE HCL IN 0.9% NACL 1 MG/10 ML
SYRINGE (ML) INTRAVENOUS PRN
Status: DISCONTINUED | OUTPATIENT
Start: 2022-11-09 | End: 2022-11-09 | Stop reason: SDUPTHER

## 2022-11-09 RX ORDER — SODIUM CHLORIDE, SODIUM LACTATE, POTASSIUM CHLORIDE, AND CALCIUM CHLORIDE .6; .31; .03; .02 G/100ML; G/100ML; G/100ML; G/100ML
IRRIGANT IRRIGATION PRN
Status: DISCONTINUED | OUTPATIENT
Start: 2022-11-09 | End: 2022-11-09 | Stop reason: ALTCHOICE

## 2022-11-09 RX ORDER — SODIUM CHLORIDE, SODIUM LACTATE, POTASSIUM CHLORIDE, CALCIUM CHLORIDE 600; 310; 30; 20 MG/100ML; MG/100ML; MG/100ML; MG/100ML
INJECTION, SOLUTION INTRAVENOUS CONTINUOUS
Status: CANCELLED | OUTPATIENT
Start: 2022-11-09

## 2022-11-09 RX ORDER — SODIUM CHLORIDE 9 MG/ML
INJECTION, SOLUTION INTRAVENOUS PRN
Status: CANCELLED | OUTPATIENT
Start: 2022-11-09

## 2022-11-09 RX ORDER — ONDANSETRON 2 MG/ML
INJECTION INTRAMUSCULAR; INTRAVENOUS PRN
Status: DISCONTINUED | OUTPATIENT
Start: 2022-11-09 | End: 2022-11-09 | Stop reason: SDUPTHER

## 2022-11-09 RX ORDER — SODIUM CHLORIDE, SODIUM LACTATE, POTASSIUM CHLORIDE, CALCIUM CHLORIDE 600; 310; 30; 20 MG/100ML; MG/100ML; MG/100ML; MG/100ML
INJECTION, SOLUTION INTRAVENOUS CONTINUOUS PRN
Status: DISCONTINUED | OUTPATIENT
Start: 2022-11-09 | End: 2022-11-09 | Stop reason: SDUPTHER

## 2022-11-09 RX ORDER — FAMOTIDINE 20 MG/1
20 TABLET, FILM COATED ORAL 2 TIMES DAILY
Qty: 60 TABLET | Refills: 3 | Status: SHIPPED | OUTPATIENT
Start: 2022-11-09

## 2022-11-09 RX ORDER — FENTANYL CITRATE 50 UG/ML
INJECTION, SOLUTION INTRAMUSCULAR; INTRAVENOUS PRN
Status: DISCONTINUED | OUTPATIENT
Start: 2022-11-09 | End: 2022-11-09 | Stop reason: SDUPTHER

## 2022-11-09 RX ORDER — OXYCODONE HYDROCHLORIDE 5 MG/1
10 TABLET ORAL PRN
Status: DISCONTINUED | OUTPATIENT
Start: 2022-11-09 | End: 2022-11-09 | Stop reason: HOSPADM

## 2022-11-09 RX ORDER — CELECOXIB 100 MG/1
200 CAPSULE ORAL ONCE
Status: COMPLETED | OUTPATIENT
Start: 2022-11-09 | End: 2022-11-09

## 2022-11-09 RX ORDER — ACETAMINOPHEN 500 MG
1000 TABLET ORAL ONCE
Status: COMPLETED | OUTPATIENT
Start: 2022-11-09 | End: 2022-11-09

## 2022-11-09 RX ORDER — SODIUM CHLORIDE 0.9 % (FLUSH) 0.9 %
5-40 SYRINGE (ML) INJECTION EVERY 12 HOURS SCHEDULED
Status: CANCELLED | OUTPATIENT
Start: 2022-11-09

## 2022-11-09 RX ORDER — DOCUSATE SODIUM 100 MG/1
100 CAPSULE, LIQUID FILLED ORAL 2 TIMES DAILY
Qty: 60 CAPSULE | Refills: 0 | Status: SHIPPED | OUTPATIENT
Start: 2022-11-09 | End: 2022-12-09

## 2022-11-09 RX ORDER — SENNA AND DOCUSATE SODIUM 50; 8.6 MG/1; MG/1
1 TABLET, FILM COATED ORAL 2 TIMES DAILY
Status: CANCELLED | OUTPATIENT
Start: 2022-11-09

## 2022-11-09 RX ORDER — ONDANSETRON 2 MG/ML
4 INJECTION INTRAMUSCULAR; INTRAVENOUS EVERY 6 HOURS PRN
Status: CANCELLED | OUTPATIENT
Start: 2022-11-09

## 2022-11-09 RX ORDER — POLYETHYLENE GLYCOL 3350 17 G/17G
17 POWDER, FOR SOLUTION ORAL DAILY PRN
Qty: 510 G | Refills: 0 | Status: SHIPPED | OUTPATIENT
Start: 2022-11-09 | End: 2022-12-09

## 2022-11-09 RX ORDER — TRANEXAMIC ACID 100 MG/ML
INJECTION, SOLUTION INTRAVENOUS PRN
Status: DISCONTINUED | OUTPATIENT
Start: 2022-11-09 | End: 2022-11-09 | Stop reason: SDUPTHER

## 2022-11-09 RX ORDER — FENTANYL CITRATE 50 UG/ML
50 INJECTION, SOLUTION INTRAMUSCULAR; INTRAVENOUS EVERY 5 MIN PRN
Status: DISCONTINUED | OUTPATIENT
Start: 2022-11-09 | End: 2022-11-09 | Stop reason: HOSPADM

## 2022-11-09 RX ORDER — FAMOTIDINE 10 MG/ML
20 INJECTION, SOLUTION INTRAVENOUS 2 TIMES DAILY
Status: CANCELLED | OUTPATIENT
Start: 2022-11-09

## 2022-11-09 RX ORDER — SODIUM CHLORIDE, SODIUM LACTATE, POTASSIUM CHLORIDE, CALCIUM CHLORIDE 600; 310; 30; 20 MG/100ML; MG/100ML; MG/100ML; MG/100ML
INJECTION, SOLUTION INTRAVENOUS CONTINUOUS
Status: DISCONTINUED | OUTPATIENT
Start: 2022-11-09 | End: 2022-11-09 | Stop reason: HOSPADM

## 2022-11-09 RX ORDER — PROPOFOL 10 MG/ML
INJECTION, EMULSION INTRAVENOUS PRN
Status: DISCONTINUED | OUTPATIENT
Start: 2022-11-09 | End: 2022-11-09 | Stop reason: SDUPTHER

## 2022-11-09 RX ORDER — ACETAMINOPHEN 500 MG
1000 TABLET ORAL EVERY 8 HOURS
Status: CANCELLED | OUTPATIENT
Start: 2022-11-09

## 2022-11-09 RX ORDER — PROMETHAZINE HYDROCHLORIDE 12.5 MG/1
12.5 TABLET ORAL 4 TIMES DAILY PRN
Qty: 20 TABLET | Refills: 0 | Status: SHIPPED | OUTPATIENT
Start: 2022-11-09 | End: 2022-11-16

## 2022-11-09 RX ORDER — ROCURONIUM BROMIDE 10 MG/ML
INJECTION, SOLUTION INTRAVENOUS PRN
Status: DISCONTINUED | OUTPATIENT
Start: 2022-11-09 | End: 2022-11-09 | Stop reason: SDUPTHER

## 2022-11-09 RX ORDER — MAGNESIUM SULFATE IN WATER 40 MG/ML
2000 INJECTION, SOLUTION INTRAVENOUS ONCE
Status: COMPLETED | OUTPATIENT
Start: 2022-11-09 | End: 2022-11-09

## 2022-11-09 RX ORDER — OXYCODONE HYDROCHLORIDE 5 MG/1
5 TABLET ORAL EVERY 6 HOURS PRN
Qty: 28 TABLET | Refills: 0 | Status: SHIPPED | OUTPATIENT
Start: 2022-11-09 | End: 2022-11-16

## 2022-11-09 RX ORDER — POLYETHYLENE GLYCOL 3350 17 G/17G
17 POWDER, FOR SOLUTION ORAL DAILY
Status: CANCELLED | OUTPATIENT
Start: 2022-11-09

## 2022-11-09 RX ORDER — LIDOCAINE HYDROCHLORIDE 10 MG/ML
1 INJECTION, SOLUTION EPIDURAL; INFILTRATION; INTRACAUDAL; PERINEURAL
Status: DISCONTINUED | OUTPATIENT
Start: 2022-11-09 | End: 2022-11-09 | Stop reason: HOSPADM

## 2022-11-09 RX ORDER — ONDANSETRON 2 MG/ML
4 INJECTION INTRAMUSCULAR; INTRAVENOUS
Status: DISCONTINUED | OUTPATIENT
Start: 2022-11-09 | End: 2022-11-09 | Stop reason: HOSPADM

## 2022-11-09 RX ORDER — LABETALOL HYDROCHLORIDE 5 MG/ML
10 INJECTION, SOLUTION INTRAVENOUS
Status: DISCONTINUED | OUTPATIENT
Start: 2022-11-09 | End: 2022-11-09 | Stop reason: HOSPADM

## 2022-11-09 RX ADMIN — FENTANYL CITRATE 50 MCG: 50 INJECTION, SOLUTION INTRAMUSCULAR; INTRAVENOUS at 14:32

## 2022-11-09 RX ADMIN — ONDANSETRON 4 MG: 2 INJECTION INTRAMUSCULAR; INTRAVENOUS at 13:27

## 2022-11-09 RX ADMIN — CEFAZOLIN 2000 MG: 10 INJECTION, POWDER, FOR SOLUTION INTRAVENOUS at 12:05

## 2022-11-09 RX ADMIN — PROPOFOL 25 MG: 10 INJECTION, EMULSION INTRAVENOUS at 13:46

## 2022-11-09 RX ADMIN — Medication 200 MCG: at 12:06

## 2022-11-09 RX ADMIN — DEXAMETHASONE SODIUM PHOSPHATE 4 MG: 4 INJECTION, SOLUTION INTRAMUSCULAR; INTRAVENOUS at 12:12

## 2022-11-09 RX ADMIN — FENTANYL CITRATE 50 MCG: 50 INJECTION INTRAMUSCULAR; INTRAVENOUS at 12:15

## 2022-11-09 RX ADMIN — SUGAMMADEX 200 MG: 100 INJECTION, SOLUTION INTRAVENOUS at 14:05

## 2022-11-09 RX ADMIN — TRANEXAMIC ACID 750 MG: 100 INJECTION, SOLUTION INTRAVENOUS at 13:27

## 2022-11-09 RX ADMIN — SODIUM CHLORIDE, SODIUM LACTATE, POTASSIUM CHLORIDE, AND CALCIUM CHLORIDE: .6; .31; .03; .02 INJECTION, SOLUTION INTRAVENOUS at 11:50

## 2022-11-09 RX ADMIN — ACETAMINOPHEN 1000 MG: 500 TABLET ORAL at 10:51

## 2022-11-09 RX ADMIN — PROPOFOL 80 MG: 10 INJECTION, EMULSION INTRAVENOUS at 12:16

## 2022-11-09 RX ADMIN — MAGNESIUM SULFATE IN WATER 2000 MG: 40 INJECTION, SOLUTION INTRAVENOUS at 12:05

## 2022-11-09 RX ADMIN — TRANEXAMIC ACID 750 MG: 100 INJECTION, SOLUTION INTRAVENOUS at 12:06

## 2022-11-09 RX ADMIN — ROCURONIUM BROMIDE 50 MG: 10 SOLUTION INTRAVENOUS at 11:55

## 2022-11-09 RX ADMIN — SODIUM CHLORIDE, SODIUM LACTATE, POTASSIUM CHLORIDE, AND CALCIUM CHLORIDE: .6; .31; .03; .02 INJECTION, SOLUTION INTRAVENOUS at 14:07

## 2022-11-09 RX ADMIN — CELECOXIB 200 MG: 100 CAPSULE ORAL at 10:51

## 2022-11-09 RX ADMIN — PROPOFOL 25 MG: 10 INJECTION, EMULSION INTRAVENOUS at 13:48

## 2022-11-09 RX ADMIN — FENTANYL CITRATE 50 MCG: 50 INJECTION INTRAMUSCULAR; INTRAVENOUS at 11:55

## 2022-11-09 RX ADMIN — PROPOFOL 120 MG: 10 INJECTION, EMULSION INTRAVENOUS at 11:55

## 2022-11-09 RX ADMIN — FENTANYL CITRATE 50 MCG: 50 INJECTION, SOLUTION INTRAMUSCULAR; INTRAVENOUS at 14:51

## 2022-11-09 ASSESSMENT — PAIN - FUNCTIONAL ASSESSMENT: PAIN_FUNCTIONAL_ASSESSMENT: 0-10

## 2022-11-09 ASSESSMENT — PAIN DESCRIPTION - PAIN TYPE: TYPE: SURGICAL PAIN

## 2022-11-09 ASSESSMENT — PAIN SCALES - GENERAL
PAINLEVEL_OUTOF10: 7
PAINLEVEL_OUTOF10: 2
PAINLEVEL_OUTOF10: 7

## 2022-11-09 ASSESSMENT — PAIN DESCRIPTION - ORIENTATION
ORIENTATION: RIGHT
ORIENTATION: RIGHT

## 2022-11-09 ASSESSMENT — PAIN DESCRIPTION - LOCATION
LOCATION: HIP
LOCATION: HIP

## 2022-11-09 NOTE — INTERVAL H&P NOTE
Date of Surgery Update:  Katherine Crockett was seen, history and physical examination reviewed, and patient examined by me today. There have been no significant clinical changes since the completion of the previous history and physical.    The risk, benefits, and alternatives of the proposed procedure have been explained to the patient (or appropriate guardian) and understanding verbalized. All questions answered. Patient wishes to proceed.     Electronically signed by: Lopez Kiran MD,11/9/2022,11:16 AM

## 2022-11-09 NOTE — ANESTHESIA PRE PROCEDURE
Frequency Provider Last Rate Last Admin    ceFAZolin (ANCEF) 2000 mg in dextrose 5 % 100 mL IVPB  2,000 mg IntraVENous On Call to [address] [place] [address], MD        lidocaine PF 1 % injection 1 mL  1 mL IntraDERmal Once PRN Tammy Lopez MD        lactated ringers infusion   IntraVENous Continuous Tammy Lopez MD        sodium chloride flush 0.9 % injection 5-40 mL  5-40 mL IntraVENous 2 times per day Tammy Lopez MD        sodium chloride flush 0.9 % injection 5-40 mL  5-40 mL IntraVENous PRN Tammy Lopez MD        0.9 % sodium chloride infusion   IntraVENous PRN Tammy Lopez MD        magnesium sulfate 2000 mg in 50 mL IVPB premix  2,000 mg IntraVENous Once Tammy Lopez MD        CEFAZOLIN 2000 MG D5W 100 ML IVPB IVPB                Allergies:  No Known Allergies    Problem List:    Patient Active Problem List   Diagnosis Code    Acquired hypothyroidism E03.9    Hyperlipidemia E78.5    Hyperbilirubinemia E80.6    Paroxysmal atrial fibrillation (HCC) I48.0    Essential hypertension I10    Chest pain R07.9    Abdominal discomfort, bloating R14.0    Hypokalemia E87.6    Abnormal cardiovascular stress test R94.39       Past Medical History:        Diagnosis Date    [facility] ([facility]     one time \"years ago\", due to hypothyroidism     Arthritis     CHF (congestive heart failure) ([facility]     mild no fluid restriction    Hyperlipidemia     Hypothyroid        Past Surgical History:        Procedure Laterality Date    APPENDECTOMY      CARPAL TUNNEL RELEASE Right     HERNIA REPAIR      KNEE SURGERY         Social History:    Social History     Tobacco Use    Smoking status: Never    Smokeless tobacco: Never   Substance Use Topics    Alcohol use:  Yes     Alcohol/week: 1.0 standard drink     Types: 1 Glasses of wine per week     Comment: occasionally                                Counseling given: Not Answered      Vital Signs (Current):   Vitals:    11/09/22 1041   BP: 126/79   Pulse: 67   Resp: 16   Temp: 97.3 °F (36.3 °C)   SpO2: 97%   Weight: 154 lb 3.2 oz (69.9 kg)   Height: 5' 1\" (1.549 m)                                              BP Readings from Last 3 Encounters:   11/09/22 126/79   11/02/22 122/78   09/01/22 111/78       NPO Status: Time of last liquid consumption: 0930                        Time of last solid consumption: 2200                        Date of last liquid consumption: 11/09/22                        Date of last solid food consumption: 11/08/22    BMI:   Wt Readings from Last 3 Encounters:   11/09/22 154 lb 3.2 oz (69.9 kg)   11/02/22 153 lb 6.4 oz (69.6 kg)   08/31/22 156 lb 8 oz (71 kg)     Body mass index is 29.14 kg/m². CBC:   Lab Results   Component Value Date/Time    WBC 4.4 09/01/2022 05:35 AM    RBC 4.80 09/01/2022 05:35 AM    HGB 14.3 09/01/2022 05:35 AM    HCT 43.0 09/01/2022 05:35 AM    MCV 89.5 09/01/2022 05:35 AM    RDW 14.9 09/01/2022 05:35 AM     09/01/2022 05:35 AM       CMP:   Lab Results   Component Value Date/Time     09/01/2022 05:35 AM    K 3.8 09/01/2022 05:35 AM    CL 99 09/01/2022 05:35 AM    CO2 24 09/01/2022 05:35 AM    BUN 16 09/01/2022 05:35 AM    CREATININE 0.6 09/01/2022 05:35 AM    GFRAA >60 09/01/2022 05:35 AM    AGRATIO 1.6 08/30/2022 04:21 PM    LABGLOM >60 09/01/2022 05:35 AM    GLUCOSE 98 09/01/2022 05:35 AM    PROT 7.7 08/30/2022 04:21 PM    CALCIUM 8.8 09/01/2022 05:35 AM    BILITOT 1.7 08/30/2022 04:21 PM    ALKPHOS 90 08/30/2022 04:21 PM    AST 36 08/30/2022 04:21 PM    ALT 37 08/30/2022 04:21 PM       POC Tests: No results for input(s): POCGLU, POCNA, POCK, POCCL, POCBUN, POCHEMO, POCHCT in the last 72 hours.     Coags:   Lab Results   Component Value Date/Time    PROTIME 12.0 11/06/2014 12:08 PM    INR 1.11 11/06/2014 12:08 PM       HCG (If Applicable): No results found for: PREGTESTUR, PREGSERUM, HCG, HCGQUANT     ABGs: No results found for: PHART, PO2ART, CNG8QQW, WFF6VPP, BEART, V5KVWWAH     Type & Screen (If Applicable):  No results found for: LABABO, LABRH    Drug/Infectious Status (If Applicable):  No results found for: HIV, HEPCAB    COVID-19 Screening (If Applicable): No results found for: COVID19        Anesthesia Evaluation  Patient summary reviewed and Nursing notes reviewed no history of anesthetic complications:   Airway: Mallampati: II  TM distance: >3 FB   Neck ROM: full  Mouth opening: > = 3 FB   Dental: normal exam         Pulmonary:Negative Pulmonary ROS                              Cardiovascular:Negative CV ROS    (+) hypertension:, dysrhythmias: atrial fibrillation, CHF:,                   Neuro/Psych:   Negative Neuro/Psych ROS              GI/Hepatic/Renal: Neg GI/Hepatic/Renal ROS       (-) GERD, liver disease and no renal disease       Endo/Other: Negative Endo/Other ROS   (+) hypothyroidism::., .    (-) diabetes mellitus               Abdominal:             Vascular: negative vascular ROS. Other Findings:           Anesthesia Plan      general and regional     ASA 3     (I discussed with the patient the risks and benefits of PIV, general anesthesia, IV Narcotics, PACU. All questions were answered the patient agrees with the plan. Fascia iliaca block for post op pain.)  Induction: intravenous. MIPS: Prophylactic antiemetics administered. Anesthetic plan and risks discussed with patient. Plan discussed with CRNA.                     Tandy Nageotte, MD   11/9/2022

## 2022-11-09 NOTE — OP NOTE
Direct Anterior Total Hip Operative Note    Patient: Tali Judge MRN: [id number]     YOB: 1950  Age: 81 y.o. Sex: female      Date of Operation: [unfilled]     Preoperative Diagnosis: End-stage osteoarthritis,  right hip. Postoperative Diagnosis: End-stage osteoarthritis, right hip. Procedure Performed: Direct anterior right total hip arthroplasty. Surgeon: Jovan Feliz MD  Assistant(s): Circulator: Prakash Alexander RN  Surgical Assistant: Alan Perry; Nuha Sarah; Vonnie Arellano  Radiology Technologist: Selma Sahni; Olimpia Reese  Relief Circulator: Shelia Dsouza RN  Relief Scrub: Suzy Man RN  Scrub Person First: Abel Rizvi  Anesthesia:general  Estimated Blood Loss:  200 ml  Drains: None  Implants:  neelam avenir 1 coxa vera stem, 50 G7 cup, 36 neutral liner, 36 -3.5 ceramic head    Indication For Operation: This is a 81 y.o. female with end-stage osteoarthritis of she right hip that failed to respond to prolonged nonoperative measures. They had no contraindications to surgery. In the office treatment was thoroughly discussed including above procedure. The relative risks and benefits of direct anterior versus posterior approach of the right hip were discussed. The relative increase risk of iatrogenic fracture, cutaneous nerve injury with a direct anterior approach were discussed along with potential wound healing problems versus the relative risks of dislocation, leg length discrepancy with posterior approach and they elected an anterior approach at this time. They gave informed consent to proceed. Description of Procedure: The patient was taken to the operating room after satisfactory induction of general endotracheal anesthesia after preoperative regional block (fascia iliacus). The patient was place on the HANA table in the usual fashion.     The  righthip and lower extremity were then prepped and draped in the usual sterile fashion for a direct anterior approach. An incision was made beginning 2 cm distal and posterior to the ASIS. Sharp dissection was carried down through the skin and subcutaneous tissue. The fascia over the fascia pao was incised longitudinally in line with the skin incision. Kocher retractors were placed. The tensor fascia muscle was peeled off the medial wall of fascia developing the interval between it and the rectus femoris. Circumflex vessels were identified and cauterized and Cobra retractors were placed extra capsularly about the femoral neck. The capsule was excised. Retractors were then placed intracapsularly and traction was applied. Proximal femoral resection was carried out a fingerbreadth proximal to the lesser trochanter. The femoral head was extracted using the power corkscrew and excellent direct visualization of the acetabulum was obtained. Retractor was placed anteriorly inferiorly and directly posteriorly as well as directly inferiorly allowing excellent circumferential exposure. The labrum was excised along the soft tissue from the floor of the acetabulum. A curette was used to identify the true medial wall. Initial medialization was carried out and then sequential reaming was carried out with the hemispherical reamers. Final reaming was carried out to a size 49. A size 50 acetabular cup was placed on the insertion handle and impacted into place under direct fluoroscopic visualization to ensure appropriate anteversion and inclination. Excellent seating was obtained and good purchase was obtained with no supplemental screws. The neutral liner was placed in the cup and impacted into place. Attention was then turned towards the femoral preparation. Posterior and inferior capsulotomies had been carried out under initial exposure following acetabular placement. The leg was then externally rotated and dropped to the floor and adducted. Superior capsulotomy was carried out allowing good visualization.  External rotation of the femur was carried out to a 120 degrees. Retractor was placed posterior to the greater trochanter and the femoral hook was placed to deliver the proximal femur into the wound. The canal was sounded with awl and a curette and maximal lateralization was carried out with the curette and the rongeuer. Sequential broaching was then carried out. Neutral varus orientation was maintained. The natural version of the proximal femur was followed. Trial reduction was carried out with the size 1 broach in place to assess leg lengths which appeared equal with a -3.5 mm head in place. Shuck test was optimal. External rotation was stable beyond 100 degrees  with the leg adducted and extended . The hip was extended and externally rotated beyond 70 degrees with no evidence of impingement. C-arm views showed ideal leg lengths and broach size and position with the trial in place. Proximal femur was then again exposed and the stem was impacted anatomically with gentle blows of the mallet to the appropriate depth to match the broach. The femoral head was implanted securely. Shuck test range of motion and stability were excellent as indicated above with the trial.     The wound was copiously irrigated with pulsatile lavage and normal saline and betadine wash. The anterior capsule and deep fascia was then injected with additional 60 cc of injection cocktail. The fascia over the fascia pao was closed with a number 2 suture. The wound was then closed in standard fashion and covered with acticoat dressing. The patient was reversed from anesthesia, taken out the hana table, extubated and taken to the recovery room in stable condition, tolerated the procedure well. Sponge and needle counts correct times 2. Complications: None  Disposition: Admission after recovery  Condition: Stable  Attending Attestation: I was present and scrubbed for the entire procedure.     Signed by: Pankaj Sheikh MD, 11/9/2022

## 2022-11-09 NOTE — DISCHARGE INSTRUCTIONS
PATIENT INSTRUCTIONS FOLLOWING OUTPATIENT   HIP REPLACEMENT SURGERY    1.   Elevate the operated area above heart level and use ice machine frequently for the first 1-2 weeks. Typically 30 minutes on and then 30 minutes off. May use as often as needed after that  2. Use the calf compression devices throughout the day. It is ok to remove them at night if they prevent you from sleeping. The more you use them the better. 3.   You may remove the dressing in 7 days. Do not apply any lotion or creams or ointments near incision. Keep incision clean and dry and covered. It is OK to apply a loose Ace wrap.    4.   You may shower in 3 days with the dressing on. It is waterproof. After removing the dressing on post op day seven it is ok to get the incision wet in the shower. Pat dry with towel when finished and then cover the incision with the gauze and ace wrap.    5.   Use walker for 1-2 weeks or as needed. Therapy will help you to know when it is ok to transition to a cane. 6.   Do not perform an exercise called a straight leg raise, this is where you are sitting or laying down and attempting to lift your whole leg straight up into the air. This will aggravate muscles around your hip and can increase your pain. 7.   You may perform ankle pumps frequently  8. The doctor or physicians assistant would like to see you in 10-14 days. Please call the office for an appointment if you do not already have one. 9.   Call your doctor if:   Your incision becomes red, swollen or develops drainage  If the wound becomes increasingly painful  If you develop fever greater than 101 degrees after the first 48 hours. 10.  Any questions? Please call [phone]. 11. Take eliquis and asa daily to help prevent blood clots  12. For pain you are instructed to start taking tylenol 1000mg every 8 hours around the clock starting the day of surgery.   Unless you have a history of gastric bleeding, stomach ulcers, or are on blood thinners other than aspirin, you are also advised to take 800mg of ibuprofen every 8 hours around the clock. You may stagger these two medications by a few hours and that would be fine. You were also given a script for oxycodone which is a narcotic pain medication that you may add to that regimen as needed every six hours. You should try to minimize the amount of oxycodone you take to avoid problems like constipation, dizziness, and dependence. 13.  You were also given a stomach acid reducer called pepcid to help protect your stomach from the effects of ibuprofen if you weren't already taking something. You should also take some stool softeners or laxatives if the narcotic makes you constipated.

## 2022-11-09 NOTE — PROGRESS NOTES
Occupational Therapy  Facility/Department: [facility] OR  Occupational Therapy Initial Assessment and Treatment     Name: Mera Duval  : 1950  MRN: [id number]  Date of Service: 2022    Discharge Recommendations:  24 hour supervision or assist, Home with Home health OT  OT Equipment Recommendations  Equipment Needed: No       Patient Diagnosis(es): The encounter diagnosis was Primary osteoarthritis of right hip. Past Medical History:  has a past medical history of A-fib ([address] Arthritis, CHF (congestive heart failure) ([facility] [address] Hyperlipidemia, and Hypothyroid. Past Surgical History:  has a past surgical history that includes Appendectomy; Carpal tunnel release (Right); hernia repair; and knee surgery. Assessment   Performance deficits / Impairments: Decreased functional mobility ; Decreased ADL status; Decreased endurance  Assessment: Mera Duval is a(n) 81 y.o. female s/p R THR, anterior approach. PLOF: ind, lives with , rather active and babysits grandkids  Due to decreased functional mobility/balance post op, pt required:   Functional Mobility: CGA for supine <> sit, CGA for sit <> stand, CGA with RW for to/from bathroom, CGA for toilet transfer  ADL Training: CGA for lynn care after urination, CGA for grooming (in stance for hand washing)  Pt required moderate verbal cues for safety, including RW management and transfer training. Pt reporting mild lightheadedness toward end of session, BP 94/61 after extensive ambulation, RN aware. Pt is functioning below baseline and would benefit from cont OT while in acute care. Once medically appropriate, rec Home with 24/7 Assist/Supervision  and HHOT upon d/c.      Prognosis: Good  Decision Making: Low Complexity  REQUIRES OT FOLLOW-UP: Yes  Activity Tolerance  Activity Tolerance: Patient Tolerated treatment well        Plan   Occupational Therapy Plan  Times Per Week: 4-6x/week  Current Treatment Recommendations: Balance training, Functional mobility training, Endurance training, Safety education & training, Equipment evaluation, education, & procurement, Patient/Caregiver education & training, Self-Care / ADL     Restrictions  Restrictions/Precautions  Restrictions/Precautions: Up as Tolerated, Weight Bearing  Lower Extremity Weight Bearing Restrictions  Right Lower Extremity Weight Bearing: Weight Bearing As Tolerated  Position Activity Restriction  Hip Precautions: Anterior hip precautions  Other position/activity restrictions: 1) Up to bedside evening of surgery 2) Bathroom privileges with assistance    Subjective   General  Chart Reviewed: Yes  Patient assessed for rehabilitation services?: Yes  Family / Caregiver Present: No  Referring Practitioner: Cary Ortiz MD  Diagnosis: R THR, anterior  Subjective  Subjective: pt agreeable to hand off from PT at OT arrival, RN approved eval; pt reporting slight numbness in leg \"it feels heavy\"   Pt c/o 2/10 surgical \"achy, sore\" pain in R hip & knee.     Social/Functional History  Social/Functional History  Lives With: Spouse  Type of Home: House  Home Layout: Two level, 1/2 bath on main level, Bed/Bath upstairs (pt planning on staying on 1st floor while she recovers with bedroom, only full bathroom is on 2nd floor)  Home Access: Stairs to enter without rails  Entrance Stairs - Number of Steps: 1+1 ABBY  Bathroom Shower/Tub: Walk-in shower, Doors  Bathroom Toilet: Standard  Bathroom Equipment: Shower chair  Home Equipment: Walker, rolling  Has the patient had two or more falls in the past year or any fall with injury in the past year?: No  ADL Assistance: Independent  Homemaking Assistance: Independent  Ambulation Assistance: Independent  Transfer Assistance: Independent  Active : Yes  Occupation: Retired  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[address]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       Objective   Heart Rate: 63  BP: 110/68  MAP (Calculated): 82  Resp: 17  SpO2: 98 %  O2 Device: None (Room air)          Observation/Palpation  Posture: Good  Safety Devices  Type of Devices: Nurse notified;Gait belt;Patient at risk for falls; All fall risk precautions in place; Left in bed;Call light within reach (RN present at bedside)  Bed Mobility Training  Bed Mobility Training: Yes  Supine to Sit: Supervision  Scooting: Supervision  Balance  Sitting: Intact  Standing: Impaired  Standing - Static: Fair;Occasional  Standing - Dynamic: Fair;Occasional  Transfer Training  Transfer Training: Yes  Interventions: Safety awareness training;Verbal cues; Visual cues  Sit to Stand: Contact-guard assistance;Stand-by assistance  Stand to Sit: Contact-guard assistance;Stand-by assistance  Bed to Chair: Contact-guard assistance;Stand-by assistance (using RW, moving from bed>chair)  Gait Training  Gait Training: Yes  Right Side Weight Bearing: As tolerated  Gait  Overall Level of Assistance: Minimum assistance;Contact-guard assistance (Paulina x 1 decreasing to CGA x 1 with repetition)  Interventions: Safety awareness training;Verbal cues; Tactile cues; Visual cues  Step Length: Left shortened;Right shortened  Gait Abnormalities: Step to gait (min cues needed for safety and gait sequencing, instructed pt to avoid placing full weight through RLE due to lingering numbness/weakness from surgery & nerve block, 2 small episodes of R hip/knee buckling at start of amb-pt able to self-correct with CGA)  Distance (ft): 60 Feet  Assistive Device: Walker, rolling;Gait belt  Number of Stairs Trained: 1  Curbs/Ramps: Minimum assistance;Contact-guard assistance (pt using RW, ascending step backwards/descending step forwards, min cues for proper technique & sequencing)  Toilet Transfers  Toilet - Technique: Ambulating  Equipment Used: Standard toilet  Toilet Transfer: Contact guard assistance  Toilet Transfers Comments: with RW  AROM: Within functional limits  Strength:  Within functional limits  Coordination: Within functional limits  Tone: Normal    ADL  Grooming: Contact guard assistance  Grooming Skilled Clinical Factors: in stance at sink for hand washing  UE Dressing: Stand by assistance  UE Dressing Skilled Clinical Factors: gown exchange  Toileting: Contact guard assistance  Toileting Skilled Clinical Factors: urination on toilet, pt managed lynn care     Activity Tolerance  Activity Tolerance: Patient tolerated evaluation without incident;Patient tolerated treatment well        Vision  Vision: Impaired  Vision Exceptions: Wears glasses at all times  Hearing  Hearing: Within functional limits  Cognition  Overall Cognitive Status: WFL  Orientation  Overall Orientation Status: Within Normal Limits  Perception  Overall Perceptual Status: WFL            Dynamic Standing Balance Exercises: ambulation to/from bathroom  Education Given To: Patient  Education Provided: Plan of Care;Precautions; ADL Adaptive Strategies; Equipment;Transfer Training;Role of Therapy  Education Provided Comments: disease specific: use of RW  Patient Educated in safety with car transfers and  dispensed instruction on car transfers with use of assistive device with patient demonstrating:  [x] Verbalizing understanding of appropriate technique, maintaining any ordered precautions for car transfer training s/p TJR  [] [place] with simulated car transfer with walker  [x] Anticipate she will require minimal assist and will have someone at discharge to help with transfers with patient verbalizing understanding of any ordered TJR precautions employing appropriate technique    Education Method: Demonstration;Verbal;Printed Information/Hand-outs  Barriers to Learning: None  Education Outcome: Verbalized understanding     AM-PAC Score        AM-[facility] Inpatient Daily Activity Raw Score: 20 (11/09/22 1709)  AM-PAC Inpatient ADL T-Scale Score : 42.03 (11/09/22 1709)  ADL Inpatient CMS 0-100% Score: 38.32 (11/09/22 1709)  ADL Inpatient CMS G-Code Modifier : CJ (11/09/22 6970)    Goals  Short Term Goals  Time Frame for Short Term Goals: within 1 week by 11/16 unless noted  Short Term Goal 1: pt will perform toilet transfer with supervision and LRAD  Short Term Goal 2: pt will perform grooming in stance with supervision and LRAD  Short Term Goal 3: pt will perform LB dressing with supervision by 11/14 with AD PRN  Patient Goals   Patient goals : \"to go home tonight\"       Therapy Time   Individual Concurrent Group Co-treatment   Time In 1635         Time Out 1700         Minutes 25         Timed Code Treatment Minutes: 15 Minutes (10 minute eval)     If pt is unable to be seen after this session, please let this note serve as discharge summary. Please see case management note for discharge disposition. Thank you.    Edgar Fowler OT

## 2022-11-09 NOTE — PROGRESS NOTES
Physical Therapy  Facility/Department: [facility] OR  Physical Therapy Initial Assessment/Discharge Summary (1x only)    Name: Anahi Abreu  : 1950  MRN: [id number]  Date of Service: 2022    Discharge Recommendations:  24 hour supervision or assist, Outpatient PT   PT Equipment Recommendations  Equipment Needed: No      Patient Diagnosis(es): The encounter diagnosis was Primary osteoarthritis of right hip. Past Medical History:  has a past medical history of A-fib ([address] Arthritis, CHF (congestive heart failure) ([facility] [address] Hyperlipidemia, and Hypothyroid. Past Surgical History:  has a past surgical history that includes Appendectomy; Carpal tunnel release (Right); hernia repair; and knee surgery. Assessment   Body Structures, Functions, Activity Limitations Requiring Skilled Therapeutic Intervention: Decreased functional mobility ; Decreased ROM; Decreased strength;Decreased sensation; Increased pain  Assessment: Pt referred for PT evaluation during current hospital stay s/p scheduled R anterior THR on 22. Pt currently limited in RLE strength and (I) with gait by lingering numbness and weakness post-surgery (mainly residual from nerve block, which had not fully worn off at time of PT evaluation). Despite limitations, pt participated well in supine LE ther ex and was able to amb distances up to 50-60 feet with RW and CGA x 1. Pt safely navigated curb step with RW and min/CGA x 1 with cueing for proper technique and off-loading of RLE for safety. Pt with 2 bouts of minor R hip/knee buckling at start of amb (due to RLE weakness/numbness), although pt was able to self-correct episodes with CGA from PT without full LOB. Instructed pt to use caution when ambulating until full RLE sensation has returned, using RW at all times and having  standing by for safety (especially during step navigation). Pt verbalizes understanding.   Recommend pt return home with 24-hr sup/assist from family & OP PT services (per ortho recs). No further acute PT needs at this time, as pt appears safe to return home. Treatment Diagnosis: Decreased ROM/strength RLE and (I) with mobility s/p R anterior THR  Therapy Prognosis: Good  Decision Making: Low Complexity  No Skilled PT: Safe to return home  Requires PT Follow-Up: No  Activity Tolerance: Patient tolerated evaluation without incident;Patient tolerated treatment well     Plan   General Plan: Discharge with evaluation only  Safety Devices: All fall risk precautions in place, Nurse notified, Gait belt, Patient at risk for falls (pt left ambulating with OT after PT session)     Restrictions  Restrictions/Precautions  Restrictions/Precautions: Up as Tolerated, Weight Bearing  Lower Extremity Weight Bearing Restrictions  Right Lower Extremity Weight Bearing: Weight Bearing As Tolerated  Position Activity Restriction  Hip Precautions: Anterior hip precautions  Other position/activity restrictions: 1) Up to bedside evening of surgery 2) Bathroom privileges with assistance     Subjective   Pain: Pt c/o 2/10 surgical \"achy, sore\" pain in R hip & knee. General  Chart Reviewed: Yes  Patient assessed for rehabilitation services?: Yes  Family / Caregiver Present: No  Referring Practitioner: Mayra Narayan MD  Referral Date : 11/09/22  Diagnosis: R hip OA, s/p R anterior THR on 11/09/22  Follows Commands: Within Functional Limits  General Comment  Comments: Pt resting in bed upon entry of therapy staff, evaluated in A PACU  Subjective  Subjective: Pt agreeable to work with PT this afternoon, pleasant and cooperative. States she's still feeling a little numb and \"heavy\" in RLE following surgery.     Social/Functional History  Social/Functional History  Lives With: Spouse  Type of Home: House  Home Layout: Two level, 1/2 bath on main level, Bed/Bath upstairs (pt planning on staying on 1st floor while she recovers with bedroom, only full bathroom is on 2nd floor)  Home Access: Stairs to enter without rails  Entrance Stairs - Number of Steps: 1+1 ABBY  Bathroom Shower/Tub: Walk-in shower, Doors  Bathroom Toilet: Standard  Bathroom Equipment: Shower chair  Home Equipment: Funifi, rolling; Cane  Has the patient had two or more falls in the past year or any fall with injury in the past year?: No  ADL Assistance: Independent  Homemaking Assistance: Independent  Ambulation Assistance: Independent  Transfer Assistance: Independent  Active : Yes  Occupation: Retired  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[address],[id number]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    Vision/Hearing  Vision  Vision: Impaired  Vision Exceptions: Wears glasses at all times  Hearing  Hearing: Within functional limits      Cognition   Orientation  Overall Orientation Status: Within Normal Limits     Objective   Vitals  Heart Rate: 63  BP: 110/68  MAP (Calculated): 82  Resp: 17  SpO2: 98 %  O2 Device: None (Room air)    Observation/Palpation  Posture: Good    Gross Assessment  AROM: Generally decreased, functional  Strength: Generally decreased, functional  Coordination: Generally decreased, functional  Tone: Normal  Sensation: Impaired (mild numbness in R thigh from nerve block, sensation otherwise WFL)     Bed Mobility Training  Supine to Sit: Supervision  Scooting: Supervision    Balance  Sitting: Intact  Standing: Impaired  Standing - Static: Fair;Occasional  Standing - Dynamic: Fair;Occasional    Transfer Training  Interventions: Safety awareness training;Verbal cues; Visual cues  Sit to Stand: Contact-guard assistance;Stand-by assistance  Stand to Sit: Contact-guard assistance;Stand-by assistance  Bed to Chair: Contact-guard assistance;Stand-by assistance (using RW, moving from bed>chair)    Gait Training  Right Side Weight Bearing: As tolerated  Overall Level of Assistance: Minimum assistance;Contact-guard assistance (Paulina x 1 decreasing to CGA x 1 with repetition)  Interventions: Safety awareness training;Verbal cues; Tactile cues; Visual cues  Step Length: Left shortened;Right shortened  Gait Abnormalities: Step to gait (min cues needed for safety and gait sequencing, instructed pt to avoid placing full weight through RLE due to lingering numbness/weakness from surgery & nerve block, 2 small episodes of R hip/knee buckling at start of amb-pt able to self-correct with CGA)  Distance (ft): 60 Feet  Assistive Device: Walker, rolling;Gait belt    Stair Training  Number of Stairs Trained: 1 (6\" curb step)  Curbs/Ramps: Minimum assistance;Contact-guard assistance (pt using RW, ascending step backwards/descending step forwards, min cues for proper technique & sequencing)    Exercise Treatment: x 10 BLE: Ankle pumps, glut/quad sets. x 10 RLE: SAQ, LAQ, supine heel slides (pt indep with all exercises). Issued HEP handout for supine RLE ther ex post-THR. AM-PAC Score  -PAC Inpatient Mobility Raw Score : 20 (11/09/22 1703)  AM-PAC Inpatient T-Scale Score : 47.67 (11/09/22 1703)  Mobility Inpatient CMS 0-100% Score: 35.83 (11/09/22 1703)  Mobility Inpatient CMS G-Code Modifier : CJ (11/09/22 1703)    Goals  Short Term Goals  Time Frame for Short Term Goals: 1 visit - 11/09/22  Short Term Goal 1: Pt will transfer supine <-> sit with supervision - MET 11/09/22  Short Term Goal 2: Pt will transfer sit <-> stand with CGA/SBA x 1 - MET 11/09/22  Short Term Goal 3: Pt will ambulate x 60 feet using RW with CGA/SBA x 1 - MET 11/09/22  Short Term Goal 4: Pt will ambulate up/down 6\" curb step using RW with CGA/SBA x 1 - MET 11/09/22  Short Term Goal 5: Pt will verbalize/demonstrate understanding of HEP for supine R hip ROM/strengthening - MET 11/09/22  Patient Goals   Patient Goals :  \"To be able to walk around my house (distances of at least 50 feet) with my walker with help from my  (Paulina x 1 or less)\" - MET 11/09/22       Education  Patient Education  Education Given To: Patient  Education Provided: Role of Therapy;Plan of Care;Home Exercise Program;Precautions;Transfer Training;Equipment; Fall Prevention Strategies  Education Method: Demonstration;Verbal;Printed Information/Hand-outs (HEP for R hip ROM/strengthening)  Barriers to Learning: None  Education Outcome: Verbalized understanding;Demonstrated understanding;Continued education needed      Therapy Time   Individual Concurrent Group Co-treatment   Time In 1600         Time Out 1634         Minutes 34         Timed Code Treatment Minutes: [address] [facility], [address] [facility], [address]

## 2022-11-10 ENCOUNTER — TELEPHONE (OUTPATIENT)
Dept: ORTHOPEDIC SURGERY | Age: 72
End: 2022-11-10

## 2022-11-10 NOTE — TELEPHONE ENCOUNTER
Spoke with pt, doing good. Reviewed medication list with pt. Incision status: No drainage or redness    Edema/Swelling/Teds: Has been icing and elevating. Pain level and status: Managing pretty well. Use of pain medications: Using as needed. Also using tylenol. Blood thinner: Back on home eliquis- no issues. Bowels: Starting a stool softener today. Home Care Agency active: NA    Outpatient therapy: OP PT- to schedule    Do you have all of your medications: Yes    Changes in medications: No    Instructed pt to call Nurse Navigator or surgeon's office with any questions or concerns. Follow up appointments:    Future Appointments   Date Time Provider Ash Joshua   11/6/2023 11:00 AM KLAUDIA Armstrong - STEPHIE HUSSEIN       Mailed letter to patient. [address] [place] [address] is participating in [address] [place] [address] for Joint Replacement (CJR) [address] [place] [address] is participating in a Medicare initiative called the [address] [place] [address] for Joint Replacement (CJR) model. Medicare designed this model to encourage higher quality care and greater financial accountability from hospitals when Medicare beneficiaries receive lower-extremity joint replacement procedures (Charmel Shane), typically hip or knee replacements. [address] [place] [address] participation in the [address] [place] [address],[address] [place] [address] should not restrict your access to care for your medical condition or your freedom to choose your health care providers and services. All existing Medicare beneficiary protections continue to be available to you. The CJR model aims to help give you better care. The CJR model aims to support better and more efficient care for beneficiaries undergoing LEJR procedures. A CJR episode of care is typically defined as an admission of an eligible Medicare beneficiary to a hospital participating in the [address] [place] [address],[address] Floor for an Charmel Shane procedure.  The LEJR procedure can take place in either an inpatient or outpatient setting and will still be considered a CJR episode of care. The CJR episode of care continues for 90 days following discharge. This model uses episode payment and quality measurement for an episode of care associated with LEJR procedures to encourage hospitals, physicians, and post-acute care providers to work together to improve the quality and coordination of care from the initial hospitalization through recovery. Under the [address], [facility] can earn additional payments from Medicare if we meet the high quality goals set by Medicare, while keeping hospital costs and care spending under control. If we dont meet these quality and cost goals, we may have to repay Medicare. Medicare is using the CJR model to encourage [facility] to work more closely with your doctors and other health care providers that help patients recover after discharge from the hospital including, but not limited to, nursing homes (skilled nursing facilities), home health agencies, inpatient rehabilitation facilities, and long- term care hospitals. If you require a stay in a [facility] (SNF) to assist with your recovery from surgery and if, and only if, it is clinically appropriate, the CJR model permits [facility] to discharge you to a high quality SNF sooner than the three days Medicare usually requires to cover a SNF stay. Medicare will monitor your care to ensure you and others are receiving high quality care. It's your choice which hospital, doctor, or other providers you use. You have the right to choose which hospital, doctor, or other post-hospital stay health care provider you use.       If you believe that your care is adversely affected or have concerns about substandard care, you may call 1-800-MEDICARE or contact your states Quality Improvement Organization by going to: Katey To find a different doctor, visit [address] Physician Compare website, [email or web address], or call 1-800-MEDICARE (2-345.473.5784). TTY users should call 4-409.274.6466. To find a different hospital, visit [employer], or  call 1-800-MEDICARE (1- 946.160.8797). TTY users should call  6-628.198.3836. To find a different skilled nursing facility, visit Julio Cesar SWANSONJhonny Hoyt  website, [email or web address]/, or call  1-800-MEDICARE (5-513.267.4725). TTY users should call 1-518-265- 2330. To find a different home health agency, visit [address] [place] [address] website, LiveBu[employer]uk, or call 1-800-MEDICARE (0-380.726.5812). TTY users should call 7-691-977- 1406. For an explanation of how patients can access their health care records and beneficiary claims data, please visit [email or web address]- [email or web address]-button/about-[email or web address]-button    Get more information     If you have questions or want more information about the Comprehensive Care for Joint Replacement (CJR) model, call [facility] at  or call 1-800-MEDICARE. You can also find additional information at https://innovation.cms.gov/initiatives/cjr.

## 2022-11-10 NOTE — TELEPHONE ENCOUNTER
Mechelle Farias MD  You 6 days ago     MARY BETH  It would be acceptable to take this medication until her new medication arrives. Script received.

## 2022-11-10 NOTE — ANESTHESIA POSTPROCEDURE EVALUATION
Department of Anesthesiology  Postprocedure Note    Patient: Riya Chauhan  MRN: [id number]  YOB: 1950  Date of evaluation: 11/9/2022      Procedure Summary     Date: 11/09/22 Room / Location: [facility]    Anesthesia Start: 1150 Anesthesia Stop: 7585    Procedure: ANTERIOR RIGHT TOTAL HIP ARTHROPLASTY WITH [address] (Right: Hip) Diagnosis:       Primary osteoarthritis of right hip      (PRIMARY OSEOARTHRITIS RIGHT HIP)    Surgeons:  Lana Waller MD Responsible Provider: Sara Young MD    Anesthesia Type: General ASA Status: 3          Anesthesia Type: General    Mateo Phase I: Mateo Score: 10    Mateo Phase II: Mateo Score: 10      Anesthesia Post Evaluation    Comments: Postoperative Anesthesia Note    Name:    Riya Chauhan  MRN:      [id number]    Patient Vitals in the past 12 hrs:  11/09/22 1605, Pulse:63, Resp:17, SpO2:98 %  11/09/22 1600, BP:110/68, Temp:97 °F (36.1 °C), Temp src:Temporal, Pulse:61, Resp:14, SpO2:100 %  11/09/22 1555, Pulse:62, Resp:14, SpO2:100 %  11/09/22 1550, Pulse:62, Resp:17, SpO2:96 %  11/09/22 1545, Pulse:55, Resp:13, SpO2:99 %  11/09/22 1540, BP:120/65, Pulse:58, Resp:(!) 8, SpO2:96 %  11/09/22 1535, Pulse:60, Resp:13, SpO2:97 %  11/09/22 1530, Pulse:58, Resp:(!) 9, SpO2:98 %  11/09/22 1525, Pulse:62, Resp:14, SpO2:100 %  11/09/22 1505, BP:113/68, Pulse:62, Resp:15, SpO2:97 %  11/09/22 1500, BP:116/74, Pulse:63, Resp:11, SpO2:99 %  11/09/22 1455, BP:117/67, Pulse:60, Resp:16, SpO2:100 %  11/09/22 1450, BP:123/72, Pulse:62, Resp:13, SpO2:98 %  11/09/22 1445, BP:129/66, Pulse:59, Resp:19, SpO2:97 %  11/09/22 1440, BP:126/74, Pulse:63, Resp:15, SpO2:100 %  11/09/22 1435, BP:121/61, Pulse:65, Resp:19, SpO2:98 %  11/09/22 1430, BP:115/76, Pulse:65, Resp:16, SpO2:100 %  11/09/22 1425, BP:130/75, Pulse:67, Resp:17, SpO2:99 %  11/09/22 1420, BP:128/80, Pulse:68, Resp:17, SpO2:99 %  11/09/22 1415, BP:128/73, Temp:(!) 96 °F (35.6 °C), Temp src:Temporal, Pulse:70, Resp:21, SpO2:98 %  11/09/22 1134, Pulse:66, SpO2:99 %  11/09/22 1041, BP:126/79, Temp:97.3 °F (36.3 °C), Pulse:67, Resp:16, SpO2:97 %, Height:5' 1\" (1.549 m), Weight:154 lb 3.2 oz (69.9 kg)     LABS:    CBC  Lab Results       Component                Value               Date/Time                  WBC                      4.4                 09/01/2022 05:35 AM        HGB                      14.3                09/01/2022 05:35 AM        HCT                      43.0                09/01/2022 05:35 AM        PLT                      180                 09/01/2022 05:35 AM   RENAL  Lab Results       Component                Value               Date/Time                  NA                       136                 09/01/2022 05:35 AM        K                        3.8                 09/01/2022 05:35 AM        CL                       99                  09/01/2022 05:35 AM        CO2                      24                  09/01/2022 05:35 AM        BUN                      16                  09/01/2022 05:35 AM        CREATININE               0.6                 09/01/2022 05:35 AM        GLUCOSE                  98                  09/01/2022 05:35 AM   COAGS  Lab Results       Component                Value               Date/Time                  PROTIME                  12.0                11/06/2014 12:08 PM        INR                      1.11                11/06/2014 12:08 PM     Intake & Output: In: 1000 (I.V.:1000)  Out: 100     Nausea & Vomiting:  No    Level of Consciousness:  Awake    Pain Assessment:  Adequate analgesia    Anesthesia Complications:  No apparent anesthetic complications    SUMMARY      Vital signs stable  OK to discharge from Stage I post anesthesia care.   Care transferred from Anesthesiology department on discharge from perioperative area

## 2022-11-11 ENCOUNTER — CARE COORDINATION (OUTPATIENT)
Dept: CASE MANAGEMENT | Age: 72
End: 2022-11-11

## 2022-11-11 NOTE — CARE COORDINATION
Spoke with patient. Incision status: States dressing is dry and intact with no drainage. Edema/Swelling: States swelling present in leg, continues to wear lora hose and ice. Pain level and status: States pain is tolerable. Use of pain medications: States taking tylenol and oxycodone every 6 hours. Bowels: States bowels have not removed. States going to start senokot. Outpatient therapy: Will call to set up an appointment for therapy. Do you have all of your medications: Yes, states taking eliquis.     Changes in medications: No    Follow up appointments:    Future Appointments   Date Time Provider Ash Joshua   11/6/2023 11:00 AM KLAUDIA Garcia - STEPHIE Mckeon [address] BSN  Care Transition Nurse for ortho bundle  [phone]

## 2022-11-15 ENCOUNTER — CARE COORDINATION (OUTPATIENT)
Dept: CASE MANAGEMENT | Age: 72
End: 2022-11-15

## 2022-11-15 NOTE — CARE COORDINATION
Spoke with patient. Incision status: States dressing dry and intact with no drainage. Edema/Swelling: States swelling present, continues to ice thigh multiple times throughout the day. Pain level and status: States pain is tolerable. Use of pain medications: States taking tylenol 1,000mg three times a day. Bowels: States bowels moved yesterday. Outpatient therapy: Starts on Thursday.     Do you have all of your medications: Yes    Changes in medications: No    Follow up appointments:    Future Appointments   Date Time Provider Ash Joshua   11/6/2023 11:00 AM KLAUDIA Anderson - STEPHIE Venegas BSN  Care Transition Nurse for ortho bundle  [phone]

## 2022-11-18 NOTE — TELEPHONE ENCOUNTER
Talked with patient and informed her to have her PCP refill that for her. She will give them a call.

## 2022-11-22 ENCOUNTER — CARE COORDINATION (OUTPATIENT)
Dept: CASE MANAGEMENT | Age: 72
End: 2022-11-22

## 2022-11-22 NOTE — CARE COORDINATION
Spoke with patient. Incision status: States dressing removed today and free from redness or drainage. Edema/Swelling: States swelling has improved. Pain level and status: States pain is tolerable. Use of pain medications: States taking pain meds today from surgeon on wrist yesterday. Bowels: No complaints. Outpatient therapy: Continues next week.     Do you have all of your medications: Yes    Changes in medications: No    Follow up appointments:    Future Appointments   Date Time Provider Ash Josiane   11/6/2023 11:00 AM KLAUDIA Martinez - CNP [employer] [facility]     Leslie Underwood BSN  Care Transition Nurse for ortho bundle  [phone]

## 2022-11-29 ENCOUNTER — CARE COORDINATION (OUTPATIENT)
Dept: CASE MANAGEMENT | Age: 72
End: 2022-11-29

## 2022-11-29 NOTE — CARE COORDINATION
Called patient for updates. Left message for return call.   Alexis Dancer BSN  Care Transition Nurse for ortho bundle  [phone]

## 2022-12-06 ENCOUNTER — CARE COORDINATION (OUTPATIENT)
Dept: CASE MANAGEMENT | Age: 72
End: 2022-12-06

## 2022-12-06 NOTE — CARE COORDINATION
Called patient for updates. Left message for return call.   Heri Donnelly BSN  Care Transition Nurse for ortho bundle  [phone]

## 2022-12-13 ENCOUNTER — CARE COORDINATION (OUTPATIENT)
Dept: CASE MANAGEMENT | Age: 72
End: 2022-12-13

## 2022-12-13 NOTE — CARE COORDINATION
Spoke with patient. Incision status: States everything is good with the incision. Edema/Swelling: States swelling has improved. Pain level and status: States pain on the side of the incision. Bowels: No complaints. Outpatient therapy: Completed and doing HEP.     Do you have all of your medications: Yes    Changes in medications: No    Follow up appointments:    Future Appointments   Date Time Provider Ash Joshua   11/6/2023 11:00 AM Sixto Sánchez, APRN - CNP Ilda Paredes BSN  Care Transition Nurse for ortho bundle  [phone]

## 2022-12-20 ENCOUNTER — CARE COORDINATION (OUTPATIENT)
Dept: CASE MANAGEMENT | Age: 72
End: 2022-12-20

## 2022-12-20 NOTE — CARE COORDINATION
Spoke with patient. Incision status: States healed. Edema/Swelling: States swelling has improved. Pain level and status: States not having pain. Bowels: No complaints. Doing HEP.     Do you have all of your medications: Yes    Changes in medications: No    Follow up appointments:    Future Appointments   Date Time Provider Ash Joshua   11/6/2023 11:00 AM Addie Samuel, APRN - STEPHIE Leavitt BSN  Care Transition Nurse for ortho bundle  [phone]

## 2023-01-04 ENCOUNTER — CARE COORDINATION (OUTPATIENT)
Dept: CASE MANAGEMENT | Age: 73
End: 2023-01-04

## 2023-01-04 NOTE — CARE COORDINATION
Spoke with patient. Incision status: States free from redness or drainage. States doing well, no complaints from hip or wrist and getting around well. Bowels: No complaints. Doing HEP. Do you have all of your medications: Yes    Changes in medications: No    Patient states doing well, and no longer needs follow up phone calls. Instructed to call for any needs, and instructed to call Dr. Minerva Dumont for any complications/concerns.     Follow up appointments:    Future Appointments   Date Time Provider Ash Joshua   11/6/2023 11:00 AM Caroline Ford, APRN - STEPHIE Mcmullen [address] BSN  Care Transition Nurse for ortho bundle  [phone]

## 2023-01-24 DIAGNOSIS — I48.0 PAROXYSMAL ATRIAL FIBRILLATION (HCC): ICD-10-CM

## 2023-01-25 DIAGNOSIS — I48.0 PAROXYSMAL ATRIAL FIBRILLATION (HCC): ICD-10-CM

## 2023-01-25 NOTE — TELEPHONE ENCOUNTER
Medication Refill    Medication needing refilled: Propafenone    Dosage of the medication: 225 MG Ext Release Cap    How are you taking this medication (QD, BID, TID, QID, PRN):   Take 1 capsule by mouth 2 times daily           30 or 90 day supply called in: 90    When will you run out of your medication: now     Which Pharmacy are we sending the medication to?:    [facility] [id number] [facility] [address] [facility] [phone]   [address] [place] [address]   Phone:  [phone]  Fax:  [phone]

## 2023-01-26 RX ORDER — PROPAFENONE HYDROCHLORIDE 225 MG/1
225 CAPSULE, EXTENDED RELEASE ORAL 2 TIMES DAILY
Qty: 180 CAPSULE | Refills: 3 | Status: SHIPPED | OUTPATIENT
Start: 2023-01-26 | End: 2024-01-21

## 2023-01-26 RX ORDER — PROPAFENONE HYDROCHLORIDE 225 MG/1
CAPSULE, EXTENDED RELEASE ORAL
Qty: 180 CAPSULE | Refills: 2 | OUTPATIENT
Start: 2023-01-26

## 2023-01-27 NOTE — TELEPHONE ENCOUNTER
Spoke to pt pharmacy to confirm Rx was received and ready for pt to . Spoke to pt to relay message that Rx was ready for .  V/U

## 2023-02-08 NOTE — TELEPHONE ENCOUNTER
Yuliana - last OV 3/5/18.   EKG - 3/5/18  Med pended for your approval PRE-OP DIAGNOSIS:  Menometrorrhagia 08-Feb-2023 10:12:23  Arnol Gonsalez

## 2023-04-27 DIAGNOSIS — I48.0 PAROXYSMAL ATRIAL FIBRILLATION (HCC): ICD-10-CM

## 2023-04-27 DIAGNOSIS — I10 ESSENTIAL HYPERTENSION: ICD-10-CM

## 2023-04-27 RX ORDER — POTASSIUM CHLORIDE 750 MG/1
CAPSULE, EXTENDED RELEASE ORAL
Qty: 180 CAPSULE | Refills: 3 | Status: SHIPPED | OUTPATIENT
Start: 2023-04-27

## 2023-04-28 RX ORDER — HYDROCHLOROTHIAZIDE 25 MG/1
TABLET ORAL
Qty: 90 TABLET | Refills: 1 | OUTPATIENT
Start: 2023-04-28

## 2023-06-02 DIAGNOSIS — I10 ESSENTIAL HYPERTENSION: ICD-10-CM

## 2023-06-02 DIAGNOSIS — I48.0 PAROXYSMAL ATRIAL FIBRILLATION (HCC): ICD-10-CM

## 2023-06-02 NOTE — TELEPHONE ENCOUNTER
Pt called to see if [facility] can refill her hydroCHLOROthiazide (HYDRODIURIL) 25 MG tablet      Pharmacy sent a previous refill request but npdd wanted either the pcp or ep to handle the medication. Preferred pharmacy is [address] [place] [address] [phone]. Last ov 11/02/2022 [facility]io. Upcoming ov 10/09/2023 indio.

## 2023-06-05 RX ORDER — HYDROCHLOROTHIAZIDE 25 MG/1
25 TABLET ORAL DAILY
Qty: 90 TABLET | Refills: 0 | Status: SHIPPED | OUTPATIENT
Start: 2023-06-05

## 2023-06-05 NOTE — TELEPHONE ENCOUNTER
Verito García MD  You 3 days ago     MARY BETH  We can refill on a one-time basis, but we did not start this medication.         RX PENDING okay to refill per [facility]

## 2023-08-23 DIAGNOSIS — I48.0 PAROXYSMAL ATRIAL FIBRILLATION (HCC): ICD-10-CM

## 2023-08-23 DIAGNOSIS — I10 ESSENTIAL HYPERTENSION: ICD-10-CM

## 2023-08-24 RX ORDER — HYDROCHLOROTHIAZIDE 25 MG/1
TABLET ORAL
Qty: 90 TABLET | Refills: 0 | Status: SHIPPED | OUTPATIENT
Start: 2023-08-24

## 2023-08-24 NOTE — TELEPHONE ENCOUNTER
Last seen in hospital 08/30/2022 VSP   Last ov 10/28/2019 NPLR   Upcoming ov 10/23/2023  BMP 09/2022

## 2023-10-04 NOTE — PROGRESS NOTES
propafenone and eliquis. Continue to monitor symptoms. Atypical chest pain - Cardiac CTA 09/2022 showed a CT calcium score of 56. Patient placed on gold directed medical therapy for further management and prevention. Plan:  Continue to monitor symptoms and heart rate. Recommend exercise as tolerated. Continue current cardiac medications. Follow up with EPNP in 6 months. Scribe's attestation: This note was scribed in the presence of Dr. Benjamin Oh MD by Eric Chung, Dr. Benjamin Oh, personally performed the services described in this documentation as scribed by Marimar Vyas RN in my presence, and it is both accurate and complete.          Benjamin Oh M.D.

## 2023-10-09 ENCOUNTER — OFFICE VISIT (OUTPATIENT)
Dept: CARDIOLOGY CLINIC | Age: 73
End: 2023-10-09
Payer: MEDICARE

## 2023-10-09 VITALS
HEIGHT: 61 IN | BODY MASS INDEX: 28.58 KG/M2 | DIASTOLIC BLOOD PRESSURE: 88 MMHG | SYSTOLIC BLOOD PRESSURE: 132 MMHG | WEIGHT: 151.4 LBS | HEART RATE: 54 BPM

## 2023-10-09 DIAGNOSIS — I49.9 IRREGULAR HEART RATE: Primary | ICD-10-CM

## 2023-10-09 PROCEDURE — 99214 OFFICE O/P EST MOD 30 MIN: CPT | Performed by: INTERNAL MEDICINE

## 2023-10-09 PROCEDURE — 1123F ACP DISCUSS/DSCN MKR DOCD: CPT | Performed by: INTERNAL MEDICINE

## 2023-10-09 PROCEDURE — 3017F COLORECTAL CA SCREEN DOC REV: CPT | Performed by: INTERNAL MEDICINE

## 2023-10-09 PROCEDURE — G8400 PT W/DXA NO RESULTS DOC: HCPCS | Performed by: INTERNAL MEDICINE

## 2023-10-09 PROCEDURE — 1090F PRES/ABSN URINE INCON ASSESS: CPT | Performed by: INTERNAL MEDICINE

## 2023-10-09 PROCEDURE — G8427 DOCREV CUR MEDS BY ELIG CLIN: HCPCS | Performed by: INTERNAL MEDICINE

## 2023-10-09 PROCEDURE — G8484 FLU IMMUNIZE NO ADMIN: HCPCS | Performed by: INTERNAL MEDICINE

## 2023-10-09 PROCEDURE — 3074F SYST BP LT 130 MM HG: CPT | Performed by: INTERNAL MEDICINE

## 2023-10-09 PROCEDURE — G8417 CALC BMI ABV UP PARAM F/U: HCPCS | Performed by: INTERNAL MEDICINE

## 2023-10-09 PROCEDURE — 93000 ELECTROCARDIOGRAM COMPLETE: CPT | Performed by: INTERNAL MEDICINE

## 2023-10-09 PROCEDURE — 3078F DIAST BP <80 MM HG: CPT | Performed by: INTERNAL MEDICINE

## 2023-10-09 PROCEDURE — 1036F TOBACCO NON-USER: CPT | Performed by: INTERNAL MEDICINE

## 2023-10-09 NOTE — PATIENT INSTRUCTIONS
Plan:  Continue to monitor symptoms and heart rate. Recommend exercise as tolerated. Continue current cardiac medications. Follow up with EPNP in 6 months.

## 2023-10-23 ENCOUNTER — OFFICE VISIT (OUTPATIENT)
Dept: CARDIOLOGY CLINIC | Age: 73
End: 2023-10-23
Payer: MEDICARE

## 2023-10-23 VITALS
WEIGHT: 149 LBS | DIASTOLIC BLOOD PRESSURE: 72 MMHG | HEART RATE: 59 BPM | OXYGEN SATURATION: 98 % | SYSTOLIC BLOOD PRESSURE: 120 MMHG | HEIGHT: 61 IN | BODY MASS INDEX: 28.13 KG/M2

## 2023-10-23 DIAGNOSIS — E78.2 MIXED HYPERLIPIDEMIA: ICD-10-CM

## 2023-10-23 DIAGNOSIS — E03.9 ACQUIRED HYPOTHYROIDISM: ICD-10-CM

## 2023-10-23 DIAGNOSIS — I48.0 PAROXYSMAL ATRIAL FIBRILLATION (HCC): ICD-10-CM

## 2023-10-23 DIAGNOSIS — I10 ESSENTIAL HYPERTENSION: ICD-10-CM

## 2023-10-23 DIAGNOSIS — I25.10 MILD CAD: Primary | ICD-10-CM

## 2023-10-23 DIAGNOSIS — Q24.5 CORONARY-MYOCARDIAL BRIDGE: ICD-10-CM

## 2023-10-23 PROCEDURE — 1123F ACP DISCUSS/DSCN MKR DOCD: CPT | Performed by: NURSE PRACTITIONER

## 2023-10-23 PROCEDURE — 1036F TOBACCO NON-USER: CPT | Performed by: NURSE PRACTITIONER

## 2023-10-23 PROCEDURE — G8417 CALC BMI ABV UP PARAM F/U: HCPCS | Performed by: NURSE PRACTITIONER

## 2023-10-23 PROCEDURE — G8484 FLU IMMUNIZE NO ADMIN: HCPCS | Performed by: NURSE PRACTITIONER

## 2023-10-23 PROCEDURE — 99213 OFFICE O/P EST LOW 20 MIN: CPT | Performed by: NURSE PRACTITIONER

## 2023-10-23 PROCEDURE — G8427 DOCREV CUR MEDS BY ELIG CLIN: HCPCS | Performed by: NURSE PRACTITIONER

## 2023-10-23 PROCEDURE — 3017F COLORECTAL CA SCREEN DOC REV: CPT | Performed by: NURSE PRACTITIONER

## 2023-10-23 PROCEDURE — 3078F DIAST BP <80 MM HG: CPT | Performed by: NURSE PRACTITIONER

## 2023-10-23 PROCEDURE — G8400 PT W/DXA NO RESULTS DOC: HCPCS | Performed by: NURSE PRACTITIONER

## 2023-10-23 PROCEDURE — 3074F SYST BP LT 130 MM HG: CPT | Performed by: NURSE PRACTITIONER

## 2023-10-23 PROCEDURE — 1090F PRES/ABSN URINE INCON ASSESS: CPT | Performed by: NURSE PRACTITIONER

## 2023-10-23 RX ORDER — ATORVASTATIN CALCIUM 80 MG/1
TABLET, FILM COATED ORAL
COMMUNITY
Start: 2023-10-04

## 2023-10-23 RX ORDER — LEVOTHYROXINE SODIUM 0.1 MG/1
TABLET ORAL
COMMUNITY
Start: 2023-08-10

## 2023-10-23 NOTE — PROGRESS NOTES
[facility]   Cardiac Evaluation    Primary Care Doctor:  Chadwick March MD    Chief Complaint   Patient presents with    Follow-up    Hypertension    Hyperlipidemia    Atrial Fibrillation        Assessment:    1. Mild CAD    2. Coronary-myocardial bridge    3. Essential hypertension    4. Paroxysmal atrial fibrillation (HCC)    5. Mixed hyperlipidemia    6. Acquired hypothyroidism        Plan:   No change in current heart/ BP medicines - will defer all future refills to PCP  Recommend aspirin and statin  Follow up here only if needed  Follow up with Dr. Eddie Manzanares as planned for atrial arrhythmia's    Vitals:    10/23/23 1338   BP: 120/72   Pulse: 59   SpO2: 98%   Weight: 67.6 kg (149 lb)   Height: 1.549 m (5' 0.98\")       Primary Cardiologist: Dr. Steven Mendez/ Dr. Eddie Manzanares     History of Present Illness:   I had the pleasure of seeing Jos Hodge (72 y.o.) in follow up for mild CAD, mid LAD myocardial bridging, Hypertension, HLD, PAF (Rythmol, Eliquis), Hypothyroidism. She denies any chest pain or shortness of breath. She follows with Dr. Nicole Lopez for arrhythmia management. Her PCP manages all her BP and cholesterol medicines. Unsure why she is here today. Reviewed all recent testing including blood work through [facility] and cardiac testing (stress test, CTA cardiac) from 1 year ago. She reports poor memory which is not new for her. Jos Hodge describes symptoms including fatigue but denies chest pain, dyspnea, palpitations, orthopnea, PND, early saiety, edema, syncope. NYHA:   I  ACC/ AHA Stage:    C    Past Medical History:   has a past medical history of A-fib ([address]), Arthritis, CHF (congestive heart failure) ([address]), Hyperlipidemia, and Hypothyroid. Surgical History:   has a past surgical history that includes Appendectomy; Carpal tunnel release (Right); hernia repair; knee surgery; and Total hip arthroplasty (Right, 11/9/2022).

## 2023-10-23 NOTE — PATIENT INSTRUCTIONS
No change in current heart/ BP medicines  Follow up with PCP  Follow up here only if needed  Follow up with Dr. Jared Mcnally as planned

## 2024-01-18 DIAGNOSIS — I48.0 PAROXYSMAL ATRIAL FIBRILLATION (HCC): ICD-10-CM

## 2024-01-19 RX ORDER — PROPAFENONE HYDROCHLORIDE 225 MG/1
225 CAPSULE, EXTENDED RELEASE ORAL 2 TIMES DAILY
Qty: 180 CAPSULE | Refills: 2 | Status: SHIPPED | OUTPATIENT
Start: 2024-01-19

## 2024-02-07 DIAGNOSIS — I48.0 PAROXYSMAL ATRIAL FIBRILLATION (HCC): ICD-10-CM

## 2024-02-07 DIAGNOSIS — I10 ESSENTIAL HYPERTENSION: ICD-10-CM

## 2024-02-08 RX ORDER — HYDROCHLOROTHIAZIDE 25 MG/1
TABLET ORAL
Qty: 90 TABLET | Refills: 3 | OUTPATIENT
Start: 2024-02-08

## 2024-02-08 NOTE — TELEPHONE ENCOUNTER
My last note said to defer future refills to PCP.  She is no longer following with general cardiology.   Emely Niño, APRN - CNP

## 2024-05-08 ENCOUNTER — OFFICE VISIT (OUTPATIENT)
Dept: CARDIOLOGY CLINIC | Age: 74
End: 2024-05-08
Payer: MEDICARE

## 2024-05-08 VITALS
WEIGHT: 150.6 LBS | BODY MASS INDEX: 28.43 KG/M2 | HEART RATE: 61 BPM | HEIGHT: 61 IN | DIASTOLIC BLOOD PRESSURE: 72 MMHG | OXYGEN SATURATION: 96 % | SYSTOLIC BLOOD PRESSURE: 120 MMHG

## 2024-05-08 DIAGNOSIS — I48.0 PAROXYSMAL ATRIAL FIBRILLATION (HCC): Primary | ICD-10-CM

## 2024-05-08 PROCEDURE — 3017F COLORECTAL CA SCREEN DOC REV: CPT

## 2024-05-08 PROCEDURE — 93000 ELECTROCARDIOGRAM COMPLETE: CPT

## 2024-05-08 PROCEDURE — G8400 PT W/DXA NO RESULTS DOC: HCPCS

## 2024-05-08 PROCEDURE — 3074F SYST BP LT 130 MM HG: CPT

## 2024-05-08 PROCEDURE — G8417 CALC BMI ABV UP PARAM F/U: HCPCS

## 2024-05-08 PROCEDURE — 1123F ACP DISCUSS/DSCN MKR DOCD: CPT

## 2024-05-08 PROCEDURE — 1036F TOBACCO NON-USER: CPT

## 2024-05-08 PROCEDURE — G8427 DOCREV CUR MEDS BY ELIG CLIN: HCPCS

## 2024-05-08 PROCEDURE — 3078F DIAST BP <80 MM HG: CPT

## 2024-05-08 PROCEDURE — 1090F PRES/ABSN URINE INCON ASSESS: CPT

## 2024-05-08 PROCEDURE — 99214 OFFICE O/P EST MOD 30 MIN: CPT

## 2024-05-08 NOTE — PROGRESS NOTES
hydrochlorothiazide 25 mg daily    Patient would like to establish with Dr Candelario    Follow up in 9 months with Dr Candelario    [facility] shauna Bazzi APRN-CNP  [facility]  [phone]

## 2024-05-08 NOTE — PATIENT INSTRUCTIONS
Continue Eliquis 5 mg twice daily for stroke risk reduction  Continue Rythmol 225 mg twice daily  Continue Lipitor and aspirin  Continue hydrochlorothiazide 25 mg daily    Patient would like to establish with Dr Candelario    Follow up in 9 months with Dr Candelario

## 2024-05-28 DIAGNOSIS — I48.0 PAROXYSMAL ATRIAL FIBRILLATION (HCC): ICD-10-CM

## 2024-10-17 DIAGNOSIS — I48.0 PAROXYSMAL ATRIAL FIBRILLATION (HCC): ICD-10-CM

## 2024-10-18 RX ORDER — PROPAFENONE HYDROCHLORIDE 225 MG/1
225 CAPSULE, EXTENDED RELEASE ORAL 2 TIMES DAILY
Qty: 180 CAPSULE | Refills: 2 | Status: SHIPPED | OUTPATIENT
Start: 2024-10-18

## 2024-11-07 ENCOUNTER — TELEPHONE (OUTPATIENT)
Dept: CARDIOLOGY CLINIC | Age: 74
End: 2024-11-07

## 2024-11-07 NOTE — TELEPHONE ENCOUNTER
Pt presented herself in the office today stating she received a letter from her [employer] insurance company that they are no longer covering the propafenone medication. The pt is concerned about this. I informed her to contact [employer] right away and that I will also send this msg along with the copied letter from her insurance company (placed in NPKK's folder in ep suite) to see if there is something we can do on our end. Please advise.

## 2024-11-07 NOTE — TELEPHONE ENCOUNTER
Called patient; urine culture negative. Recommend continuation of Lotrisone cream, as well as keeping the area clean and dry. Will discuss next steps at follow-up appointment.    Spoke with pt regarding ppw. Pt just got a refill of propafenone. Pt will contact her insurance about the covering of propafenone.

## 2024-12-30 DIAGNOSIS — I48.0 PAROXYSMAL ATRIAL FIBRILLATION (HCC): ICD-10-CM

## 2024-12-30 RX ORDER — PROPAFENONE HYDROCHLORIDE 150 MG/1
150 TABLET, COATED ORAL EVERY 8 HOURS
Qty: 270 TABLET | Refills: 0 | Status: SHIPPED | OUTPATIENT
Start: 2024-12-30

## 2024-12-30 NOTE — TELEPHONE ENCOUNTER
Patient was recently switched from long acting to short acting rythmol due to insurance coverage. Patient was on short acting previously and still had refills left- was told to call us when she needed a refill. NPKK and JOBY OOT. Next visit [facility] 2/2025. Last EKG 5/2024. Sending to NPAM for sign off.     Copied from previous telephone encounter-     Me     AD    11/21/24  3:13 PM  Note     Gave patient NPKK message. She prefers to be on short acting rythmol 150mg TID. She still has refills left- she will call us when she needs a refill. Epic med list updated to reflect change.

## 2024-12-30 NOTE — TELEPHONE ENCOUNTER
Pt called to see if she can switch over to propafenone. Pt stated insurance accepts this medication. Preferred pharmacy is     [facility] [id number] ANALY [place]RAEL [phone] - F [phone] [[id number]]     Last ov 5/8/24 NPKK

## 2025-02-24 NOTE — PROGRESS NOTES
Assessment:     1. Atrial fibrillation: patient has paroxysmal atrial fibrillation.    Associated symptoms: Palpitations     History of cardioversion: Unknown  History of AF ablation: No history of atrial fibrillation ablation in the past  History of heart surgery/procedure: no  History of other cardiac arrhythmias: no    Current use of anti-arrhythmic drugs: propafenone (reportedly since 2014)  Previous other use of anti-arrhythmic drugs: Not previously on any anti-arrhythmic drugs    Overall LV function: Normal left ventricular systolic function  Size of left atrium: Echocardiogram results pending  Significant cardiac valvular disease: Echocardiogram results pending    Family history of atrial fibrillation: No    Alcohol consumption: Moderate alcohol consumption  Smoking status: non-smoker  Obstructive sleep apnea: Suspected, not yet tested  Exercise status: Does not exercise regularly (sedentary lifestyle)    I had a discussion with the patient about issues related to atrial fibrillation (including etiology, disease progression patterns, stroke risk and rate control issues). Patient had her questions answered to her satisfaction.      Patient has a [id number] score of 3 [Age over 65 (1 point), Female gender (1 point), and Hypertension (1 point)]  Longterm anticoagulation is: recommended  Current anticoagulation: Apixaban  Bleeding issues reported: no  Renal function: Normal renal function  Thyroid function: Normal    Patient diagnosed with atrial fibrillation many years ago and has been on propafenone for more than 10 years. Denies any recent symptomatic episodes. No reported side effects on propafenone. Not on concomitant AV blocker. In sinus rhythm today.     Will arrange for an event monitor (2 weeks) to assess for any asymptomatic AF or bradycardia issues. Consider adding beta-blocker depending on findings.     Will also order repeat echocardiogram to re-assess overall cardia structure and function.     2.

## 2025-02-25 ENCOUNTER — ANCILLARY PROCEDURE (OUTPATIENT)
Dept: CARDIOLOGY CLINIC | Age: 75
End: 2025-02-25

## 2025-02-25 ENCOUNTER — OFFICE VISIT (OUTPATIENT)
Dept: CARDIOLOGY CLINIC | Age: 75
End: 2025-02-25

## 2025-02-25 ENCOUNTER — TELEPHONE (OUTPATIENT)
Dept: CARDIOLOGY CLINIC | Age: 75
End: 2025-02-25

## 2025-02-25 VITALS
OXYGEN SATURATION: 99 % | DIASTOLIC BLOOD PRESSURE: 74 MMHG | WEIGHT: 156.4 LBS | BODY MASS INDEX: 29.53 KG/M2 | HEART RATE: 61 BPM | SYSTOLIC BLOOD PRESSURE: 110 MMHG | HEIGHT: 61 IN

## 2025-02-25 DIAGNOSIS — I48.0 PAROXYSMAL ATRIAL FIBRILLATION (HCC): ICD-10-CM

## 2025-02-25 DIAGNOSIS — I10 PRIMARY HYPERTENSION: ICD-10-CM

## 2025-02-25 DIAGNOSIS — I48.0 PAROXYSMAL ATRIAL FIBRILLATION (HCC): Primary | ICD-10-CM

## 2025-02-25 DIAGNOSIS — I25.10 CORONARY ARTERY DISEASE INVOLVING NATIVE CORONARY ARTERY OF NATIVE HEART WITHOUT ANGINA PECTORIS: ICD-10-CM

## 2025-02-25 DIAGNOSIS — E78.5 DYSLIPIDEMIA: ICD-10-CM

## 2025-02-25 LAB
ECHO BSA: 1.75 M2
EKG ATRIAL RATE: 61 BPM
EKG DIAGNOSIS: NORMAL
EKG P AXIS: 33 DEGREES
EKG P-R INTERVAL: 156 MS
EKG Q-T INTERVAL: 406 MS
EKG QRS DURATION: 78 MS
EKG QTC CALCULATION (BAZETT): 408 MS
EKG R AXIS: -55 DEGREES
EKG T AXIS: 21 DEGREES
EKG VENTRICULAR RATE: 61 BPM

## 2025-02-25 ASSESSMENT — ENCOUNTER SYMPTOMS
LEFT EYE: 0
WHEEZING: 0
HEMATEMESIS: 0
SHORTNESS OF BREATH: 0
STRIDOR: 0
HEMATOCHEZIA: 0
RIGHT EYE: 0

## 2025-02-25 NOTE — PATIENT INSTRUCTIONS
Plan:     Echocardiogram to assess heart structure and function.   Cardiac event monitor for two weeks to assess heart rate and rhythm.   Reduce alcohol consumption with the goal of avoidance.   Continue taking current cardiac medications as prescribed.   Follow up with my nurse practitioner in 6 months.     Your provider has ordered testing for further evaluation.  An order/prescription has been included in your paper work.   To schedule outpatient testing, contact Central Scheduling by calling [facility] ([phone]).

## 2025-02-25 NOTE — TELEPHONE ENCOUNTER
Monitor company [employer]  Length of monitor 12 days  Monitor ordered by [employer]  Patch ID [id number], [id number]  Device number [id number]  Monitor given to patient.   Activation successful prior to pt leaving office- yes   Instructions given to patient.  Patient verbalized understanding.  All questions answered to the best of my ability.

## 2025-03-22 ENCOUNTER — APPOINTMENT (OUTPATIENT)
Dept: GENERAL RADIOLOGY | Age: 75
End: 2025-03-22
Payer: MEDICARE

## 2025-03-22 ENCOUNTER — HOSPITAL ENCOUNTER (EMERGENCY)
Age: 75
Discharge: HOME | End: 2025-03-23
Attending: EMERGENCY MEDICINE
Payer: MEDICARE

## 2025-03-22 VITALS
SYSTOLIC BLOOD PRESSURE: 142 MMHG | HEIGHT: 61 IN | WEIGHT: 155 LBS | OXYGEN SATURATION: 96 % | DIASTOLIC BLOOD PRESSURE: 89 MMHG | TEMPERATURE: 97.8 F | RESPIRATION RATE: 15 BRPM | BODY MASS INDEX: 29.27 KG/M2 | HEART RATE: 64 BPM

## 2025-03-22 DIAGNOSIS — I10 UNCONTROLLED HYPERTENSION: Primary | ICD-10-CM

## 2025-03-22 DIAGNOSIS — M79.602 LEFT ARM PAIN: ICD-10-CM

## 2025-03-22 LAB
ALBUMIN SERPL-MCNC: 4.4 G/DL (ref 3.4–5)
ALBUMIN/GLOB SERPL: 1.5 {RATIO} (ref 1.1–2.2)
ALP SERPL-CCNC: 108 U/L (ref 40–129)
ALT SERPL-CCNC: 47 U/L (ref 10–40)
ANION GAP SERPL CALCULATED.3IONS-SCNC: 12 MMOL/L (ref 3–16)
AST SERPL-CCNC: 50 U/L (ref 15–37)
BASOPHILS # BLD: 0.1 K/UL (ref 0–0.2)
BASOPHILS NFR BLD: 1.3 %
BILIRUB SERPL-MCNC: 1.4 MG/DL (ref 0–1)
BUN SERPL-MCNC: 15 MG/DL (ref 7–20)
CALCIUM SERPL-MCNC: 9.6 MG/DL (ref 8.3–10.6)
CHLORIDE SERPL-SCNC: 101 MMOL/L (ref 99–110)
CO2 SERPL-SCNC: 27 MMOL/L (ref 21–32)
CREAT SERPL-MCNC: 0.8 MG/DL (ref 0.6–1.2)
DEPRECATED RDW RBC AUTO: 14.8 % (ref 12.4–15.4)
EOSINOPHIL # BLD: 0.2 K/UL (ref 0–0.6)
EOSINOPHIL NFR BLD: 3 %
GFR SERPLBLD CREATININE-BSD FMLA CKD-EPI: 78 ML/MIN/{1.73_M2}
GLUCOSE SERPL-MCNC: 123 MG/DL (ref 70–99)
HCT VFR BLD AUTO: 43.3 % (ref 36–48)
HGB BLD-MCNC: 14.6 G/DL (ref 12–16)
LYMPHOCYTES # BLD: 1.4 K/UL (ref 1–5.1)
LYMPHOCYTES NFR BLD: 27.5 %
MCH RBC QN AUTO: 29.4 PG (ref 26–34)
MCHC RBC AUTO-ENTMCNC: 33.7 G/DL (ref 31–36)
MCV RBC AUTO: 87.3 FL (ref 80–100)
MONOCYTES # BLD: 0.5 K/UL (ref 0–1.3)
MONOCYTES NFR BLD: 9.2 %
NEUTROPHILS # BLD: 3.1 K/UL (ref 1.7–7.7)
NEUTROPHILS NFR BLD: 59 %
PLATELET # BLD AUTO: 231 K/UL (ref 135–450)
PMV BLD AUTO: 9.1 FL (ref 5–10.5)
POTASSIUM SERPL-SCNC: 3.6 MMOL/L (ref 3.5–5.1)
PROT SERPL-MCNC: 7.4 G/DL (ref 6.4–8.2)
RBC # BLD AUTO: 4.96 M/UL (ref 4–5.2)
SODIUM SERPL-SCNC: 140 MMOL/L (ref 136–145)
TROPONIN, HIGH SENSITIVITY: 7 NG/L (ref 0–14)
TROPONIN, HIGH SENSITIVITY: <6 NG/L (ref 0–14)
WBC # BLD AUTO: 5.2 K/UL (ref 4–11)

## 2025-03-22 PROCEDURE — 99285 EMERGENCY DEPT VISIT HI MDM: CPT

## 2025-03-22 PROCEDURE — 93005 ELECTROCARDIOGRAM TRACING: CPT | Performed by: EMERGENCY MEDICINE

## 2025-03-22 PROCEDURE — 93005 ELECTROCARDIOGRAM TRACING: CPT

## 2025-03-22 PROCEDURE — 71046 X-RAY EXAM CHEST 2 VIEWS: CPT

## 2025-03-22 PROCEDURE — 36415 COLL VENOUS BLD VENIPUNCTURE: CPT

## 2025-03-22 PROCEDURE — 80053 COMPREHEN METABOLIC PANEL: CPT

## 2025-03-22 PROCEDURE — 84484 ASSAY OF TROPONIN QUANT: CPT

## 2025-03-22 PROCEDURE — 85025 COMPLETE CBC W/AUTO DIFF WBC: CPT

## 2025-03-22 ASSESSMENT — PAIN - FUNCTIONAL ASSESSMENT: PAIN_FUNCTIONAL_ASSESSMENT: 0-10

## 2025-03-22 ASSESSMENT — PAIN DESCRIPTION - LOCATION: LOCATION: ARM

## 2025-03-22 ASSESSMENT — PAIN DESCRIPTION - DESCRIPTORS: DESCRIPTORS: ACHING

## 2025-03-22 ASSESSMENT — PAIN SCALES - GENERAL: PAINLEVEL_OUTOF10: 4

## 2025-03-22 ASSESSMENT — PAIN DESCRIPTION - ORIENTATION: ORIENTATION: LEFT

## 2025-03-23 LAB
EKG ATRIAL RATE: 58 BPM
EKG ATRIAL RATE: 59 BPM
EKG ATRIAL RATE: 60 BPM
EKG DIAGNOSIS: NORMAL
EKG P AXIS: 3 DEGREES
EKG P AXIS: 58 DEGREES
EKG P AXIS: 69 DEGREES
EKG P-R INTERVAL: 174 MS
EKG P-R INTERVAL: 176 MS
EKG P-R INTERVAL: 196 MS
EKG Q-T INTERVAL: 342 MS
EKG Q-T INTERVAL: 384 MS
EKG Q-T INTERVAL: 462 MS
EKG QRS DURATION: 88 MS
EKG QRS DURATION: 92 MS
EKG QRS DURATION: 92 MS
EKG QTC CALCULATION (BAZETT): 338 MS
EKG QTC CALCULATION (BAZETT): 384 MS
EKG QTC CALCULATION (BAZETT): 453 MS
EKG R AXIS: -37 DEGREES
EKG R AXIS: -41 DEGREES
EKG R AXIS: 91 DEGREES
EKG T AXIS: -1 DEGREES
EKG T AXIS: 138 DEGREES
EKG T AXIS: 40 DEGREES
EKG VENTRICULAR RATE: 58 BPM
EKG VENTRICULAR RATE: 59 BPM
EKG VENTRICULAR RATE: 60 BPM

## 2025-03-23 PROCEDURE — 93010 ELECTROCARDIOGRAM REPORT: CPT | Performed by: INTERNAL MEDICINE

## 2025-03-23 NOTE — ED PROVIDER NOTES
EMERGENCY DEPARTMENT ENCOUNTER     [facility] EMERGENCY DEPARTMENT     Pt Name: Carla Pinedo   MRN: [id number]   Birthdate 1950   Date of evaluation: 3/22/2025   Provider: Kalpana Colmenares MD   PCP: Ghanshyam Cross MD   Note Started: 9:55 PM EDT 3/22/25     CHIEF COMPLAINT:     Chief Complaint   Patient presents with    Hypertension     Bp high at home for a couple days. Pt having pain in left arm/armpit. 4/10 pain        HISTORY OF PRESENT ILLNESS:  Adult female who comes in with elevated blood pressure.  Patient states that she is having this vague left underarm discomfort radiating to the axilla that has been persistent for the past 2 days.  She rates it as a 4/10 but she states it is not really a pain more of a vague discomfort.  She has no chest pain no shortness of breath no palpitations lightheadedness nausea or vomiting.  She comes in tonight because she checked her blood pressure and she noticed that it was persistently in the 170s which concerned her.  She does admit to being anxious and worried that she may be having a heart attack so she wanted to come in to get checked out.    PHYSICAL EXAM:    ED Triage Vitals [03/22/25 2132]   BP Systolic BP Percentile Diastolic BP Percentile Temp Temp Source Pulse Respirations SpO2   (!) 177/85 -- -- 97.8 °F (36.6 °C) Oral 65 18 99 %      Height Weight - Scale         1.549 m (5' 1\") 70.3 kg (155 lb)              Physical Exam  Vitals and nursing note reviewed.   Constitutional:       Appearance: Normal appearance. She is well-developed. She is not ill-appearing.   HENT:      Head: Normocephalic and atraumatic.      Right Ear: External ear normal.      Left Ear: External ear normal.      Nose: Nose normal.   Eyes:      General: No scleral icterus.        Right eye: No discharge.         Left eye: No discharge.      Conjunctiva/sclera: Conjunctivae normal.   Cardiovascular:      Rate and Rhythm: Normal rate and regular rhythm.      Heart

## 2025-03-26 LAB — ECHO BSA: 1.75 M2

## 2025-03-27 ENCOUNTER — HOSPITAL ENCOUNTER (OUTPATIENT)
Dept: CARDIOLOGY | Age: 75
Discharge: HOME | End: 2025-03-29
Attending: INTERNAL MEDICINE
Payer: MEDICARE

## 2025-03-27 ENCOUNTER — RESULTS FOLLOW-UP (OUTPATIENT)
Dept: CARDIOLOGY CLINIC | Age: 75
End: 2025-03-27

## 2025-03-27 VITALS
DIASTOLIC BLOOD PRESSURE: 74 MMHG | WEIGHT: 156 LBS | BODY MASS INDEX: 29.45 KG/M2 | HEIGHT: 61 IN | SYSTOLIC BLOOD PRESSURE: 110 MMHG

## 2025-03-27 DIAGNOSIS — I48.0 PAROXYSMAL ATRIAL FIBRILLATION (HCC): ICD-10-CM

## 2025-03-27 LAB
ECHO AO ASC DIAM: 3.5 CM
ECHO AO ASCENDING AORTA INDEX: 2.06 CM/M2
ECHO AO ROOT DIAM: 2.6 CM
ECHO AO ROOT INDEX: 1.53 CM/M2
ECHO AV CUSP MM: 1.9 CM
ECHO AV MEAN GRADIENT: 3 MMHG
ECHO AV MEAN VELOCITY: 0.8 M/S
ECHO AV PEAK GRADIENT: 5 MMHG
ECHO AV PEAK GRADIENT: 5 MMHG
ECHO AV PEAK VELOCITY: 1.2 M/S
ECHO AV VELOCITY RATIO: 0.92
ECHO AV VTI: 26.1 CM
ECHO BSA: 1.75 M2
ECHO EST RA PRESSURE: 3 MMHG
ECHO LA AREA 2C: 15.4 CM2
ECHO LA AREA 4C: 15.3 CM2
ECHO LA DIAMETER INDEX: 1.88 CM/M2
ECHO LA DIAMETER: 3.2 CM
ECHO LA MAJOR AXIS: 4.8 CM
ECHO LA MINOR AXIS: 5 CM
ECHO LA TO AORTIC ROOT RATIO: 1.23
ECHO LA VOL BP: 39 ML (ref 22–52)
ECHO LA VOL MOD A2C: 39 ML (ref 22–52)
ECHO LA VOL MOD A4C: 39 ML (ref 22–52)
ECHO LA VOL/BSA BIPLANE: 23 ML/M2 (ref 16–34)
ECHO LA VOLUME INDEX MOD A2C: 23 ML/M2 (ref 16–34)
ECHO LA VOLUME INDEX MOD A4C: 23 ML/M2 (ref 16–34)
ECHO LV E' LATERAL VELOCITY: 8.16 CM/S
ECHO LV E' SEPTAL VELOCITY: 9.57 CM/S
ECHO LV EDV 3D: 95 ML
ECHO LV EDV INDEX 3D: 56 ML/M2
ECHO LV EF PHYSICIAN: 58 %
ECHO LV EJECTION FRACTION 3D: 55 %
ECHO LV ESV 3D: 42 ML
ECHO LV ESV INDEX 3D: 25 ML/M2
ECHO LV FRACTIONAL SHORTENING: 40 % (ref 28–44)
ECHO LV GLOBAL LONGITUDINAL STRAIN (GLS): -17.2 %
ECHO LV GLOBAL LONGITUDINAL STRAIN (GLS): -17.5 %
ECHO LV GLOBAL LONGITUDINAL STRAIN (GLS): -17.9 %
ECHO LV GLOBAL LONGITUDINAL STRAIN (GLS): -19.1 %
ECHO LV INTERNAL DIMENSION DIASTOLE INDEX: 2.65 CM/M2
ECHO LV INTERNAL DIMENSION DIASTOLIC: 4.5 CM (ref 3.9–5.3)
ECHO LV INTERNAL DIMENSION SYSTOLIC INDEX: 1.59 CM/M2
ECHO LV INTERNAL DIMENSION SYSTOLIC: 2.7 CM
ECHO LV ISOVOLUMETRIC RELAXATION TIME (IVRT): 71 MS
ECHO LV IVSD: 0.9 CM (ref 0.6–0.9)
ECHO LV MASS 2D: 123.1 G (ref 67–162)
ECHO LV MASS 3D INDEX: 49.4 G/M2
ECHO LV MASS 3D: 84 G
ECHO LV MASS INDEX 2D: 72.4 G/M2 (ref 43–95)
ECHO LV POSTERIOR WALL DIASTOLIC: 0.8 CM (ref 0.6–0.9)
ECHO LV RELATIVE WALL THICKNESS RATIO: 0.36
ECHO LVOT AV VTI INDEX: 0.95
ECHO LVOT MEAN GRADIENT: 3 MMHG
ECHO LVOT PEAK GRADIENT: 5 MMHG
ECHO LVOT PEAK VELOCITY: 1.1 M/S
ECHO LVOT VTI: 24.8 CM
ECHO MV A VELOCITY: 0.61 M/S
ECHO MV E DECELERATION TIME (DT): 235 MS
ECHO MV E VELOCITY: 0.74 M/S
ECHO MV E/A RATIO: 1.21
ECHO MV E/E' LATERAL: 9.07
ECHO MV E/E' RATIO (AVERAGED): 8.4
ECHO MV E/E' SEPTAL: 7.73
ECHO RA AREA 4C: 10.3 CM2
ECHO RA END SYSTOLIC VOLUME APICAL 4 CHAMBER INDEX BSA: 10 ML/M2
ECHO RA VOLUME: 17 ML
ECHO RIGHT VENTRICULAR SYSTOLIC PRESSURE (RVSP): 25 MMHG
ECHO RV FREE WALL PEAK S': 10.4 CM/S
ECHO RV TAPSE: 1.8 CM (ref 1.7–?)
ECHO TV REGURGITANT MAX VELOCITY: 2.34 M/S
ECHO TV REGURGITANT PEAK GRADIENT: 22 MMHG

## 2025-03-27 PROCEDURE — 93306 TTE W/DOPPLER COMPLETE: CPT

## 2025-03-27 RX ORDER — PROPAFENONE HYDROCHLORIDE 150 MG/1
TABLET, COATED ORAL
Qty: 270 TABLET | Refills: 0 | Status: SHIPPED | OUTPATIENT
Start: 2025-03-27

## 2025-04-23 NOTE — PROGRESS NOTES
Called for patient report.  Patient is at Stress test.Caroline Kohli RN minutes on the discharge service.

## 2025-05-09 DIAGNOSIS — I48.0 PAROXYSMAL ATRIAL FIBRILLATION (HCC): ICD-10-CM

## 2025-05-09 NOTE — TELEPHONE ENCOUNTER
Last Office Visit: 2/25/2025 Provider: TIKI  **Is provider OOT? No    Next Office Visit: 5/30/2025 Provider: TIKI  **Has patient already had 30 day supply? No    Lab orders needed? no   Encounter provider correct? Yes If not, change provider  Script changes since last refill? no    LAST LABS:   CBC:  Lab Results   Component Value Date    WBC 5.2 03/22/2025    HGB 14.6 03/22/2025    HCT 43.3 03/22/2025    MCV 87.3 03/22/2025     03/22/2025    LYMPHOPCT 27.5 03/22/2025    RBC 4.96 03/22/2025    MCH 29.4 03/22/2025    MCHC 33.7 03/22/2025    RDW 14.8 03/22/2025       BMP:  Lab Results   Component Value Date/Time     03/22/2025 10:00 PM    K 3.6 03/22/2025 10:00 PM     03/22/2025 10:00 PM    CO2 27 03/22/2025 10:00 PM    BUN 15 03/22/2025 10:00 PM    CREATININE 0.8 03/22/2025 10:00 PM    GLUCOSE 123 03/22/2025 10:00 PM    CALCIUM 9.6 03/22/2025 10:00 PM    LABGLOM 78 03/22/2025 10:00 PM

## 2025-05-09 NOTE — TELEPHONE ENCOUNTER
Medication Refill    Medication needing refilled:  apixaban (ELIQUIS)   Dosage of the medication:  5mg  How are you taking this medication (QD, BID, TID, QID, PRN):  bid  30 or 90 day supply called in:  90      Which Pharmacy are we sending the medication to?:    [facility] - [place], [place]     Last ov 02/25/25 nivia

## 2025-05-30 ENCOUNTER — OFFICE VISIT (OUTPATIENT)
Dept: CARDIOLOGY CLINIC | Age: 75
End: 2025-05-30
Payer: MEDICARE

## 2025-05-30 ENCOUNTER — TELEPHONE (OUTPATIENT)
Dept: CARDIOLOGY CLINIC | Age: 75
End: 2025-05-30

## 2025-05-30 VITALS
WEIGHT: 150.13 LBS | OXYGEN SATURATION: 98 % | HEIGHT: 61 IN | DIASTOLIC BLOOD PRESSURE: 80 MMHG | BODY MASS INDEX: 28.35 KG/M2 | SYSTOLIC BLOOD PRESSURE: 112 MMHG | HEART RATE: 55 BPM

## 2025-05-30 DIAGNOSIS — I25.10 CORONARY ARTERY DISEASE INVOLVING NATIVE CORONARY ARTERY OF NATIVE HEART WITHOUT ANGINA PECTORIS: ICD-10-CM

## 2025-05-30 DIAGNOSIS — I48.0 PAROXYSMAL ATRIAL FIBRILLATION (HCC): Primary | ICD-10-CM

## 2025-05-30 DIAGNOSIS — I10 PRIMARY HYPERTENSION: ICD-10-CM

## 2025-05-30 DIAGNOSIS — I05.9 MITRAL VALVE DISEASE: ICD-10-CM

## 2025-05-30 LAB
EKG ATRIAL RATE: 55 BPM
EKG DIAGNOSIS: NORMAL
EKG P AXIS: 34 DEGREES
EKG P-R INTERVAL: 146 MS
EKG Q-T INTERVAL: 430 MS
EKG QRS DURATION: 88 MS
EKG QTC CALCULATION (BAZETT): 411 MS
EKG R AXIS: -42 DEGREES
EKG T AXIS: 77 DEGREES
EKG VENTRICULAR RATE: 55 BPM

## 2025-05-30 PROCEDURE — 99214 OFFICE O/P EST MOD 30 MIN: CPT | Performed by: INTERNAL MEDICINE

## 2025-05-30 PROCEDURE — G8400 PT W/DXA NO RESULTS DOC: HCPCS | Performed by: INTERNAL MEDICINE

## 2025-05-30 PROCEDURE — 1036F TOBACCO NON-USER: CPT | Performed by: INTERNAL MEDICINE

## 2025-05-30 PROCEDURE — 3079F DIAST BP 80-89 MM HG: CPT | Performed by: INTERNAL MEDICINE

## 2025-05-30 PROCEDURE — 3017F COLORECTAL CA SCREEN DOC REV: CPT | Performed by: INTERNAL MEDICINE

## 2025-05-30 PROCEDURE — 1159F MED LIST DOCD IN RCRD: CPT | Performed by: INTERNAL MEDICINE

## 2025-05-30 PROCEDURE — G8427 DOCREV CUR MEDS BY ELIG CLIN: HCPCS | Performed by: INTERNAL MEDICINE

## 2025-05-30 PROCEDURE — G2211 COMPLEX E/M VISIT ADD ON: HCPCS | Performed by: INTERNAL MEDICINE

## 2025-05-30 PROCEDURE — 1090F PRES/ABSN URINE INCON ASSESS: CPT | Performed by: INTERNAL MEDICINE

## 2025-05-30 PROCEDURE — 3074F SYST BP LT 130 MM HG: CPT | Performed by: INTERNAL MEDICINE

## 2025-05-30 PROCEDURE — G8417 CALC BMI ABV UP PARAM F/U: HCPCS | Performed by: INTERNAL MEDICINE

## 2025-05-30 PROCEDURE — 1123F ACP DISCUSS/DSCN MKR DOCD: CPT | Performed by: INTERNAL MEDICINE

## 2025-05-30 ASSESSMENT — ENCOUNTER SYMPTOMS
LEFT EYE: 0
HEMATOCHEZIA: 0
RIGHT EYE: 0
SHORTNESS OF BREATH: 0
STRIDOR: 0
HEMATEMESIS: 0
WHEEZING: 0

## 2025-05-30 NOTE — PATIENT INSTRUCTIONS
Plan:     Reduce alcohol consumption with the goal of avoidance.   Continue taking current cardiac medications as prescribed.   Follow up with my nurse practitioner in 6 months.

## 2025-05-30 NOTE — TELEPHONE ENCOUNTER
CARDIAC CLEARANCE REQUEST    What type of procedure are you having:  colonoscopy  Are you taking any blood thinners:  Yes- hold 2 days  Type on anesthesia:  general  When is your procedure scheduled for:  6.24.25  What physician is performing your procedure:  Neptlai Austin  Phone Number:  [phone]  Fax number to send the letter:  [phone]    Last ov 5.30.25 KXA  Next ov 11.21.25 NPKK

## 2025-05-30 NOTE — PROGRESS NOTES
Assessment:     1. Atrial fibrillation: patient has paroxysmal atrial fibrillation.    Associated symptoms: Palpitations     History of cardioversion: Unknown  History of AF ablation: No history of atrial fibrillation ablation in the past  History of heart surgery/procedure: no  History of other cardiac arrhythmias: no    Current use of anti-arrhythmic drugs: propafenone (reportedly since 2014)  Previous other use of anti-arrhythmic drugs: Not previously on any anti-arrhythmic drugs    Overall LV function: Normal left ventricular systolic function  Size of left atrium: Normal LA size  Significant cardiac valvular disease: mild to Moderate mitral regurgitation    Family history of atrial fibrillation: No    Alcohol consumption: Moderate alcohol consumption  Smoking status: non-smoker  Obstructive sleep apnea: Suspected, not yet tested  Exercise status: Does not exercise regularly (sedentary lifestyle)    I have had discussions with the patient about issues related to atrial fibrillation (including etiology, disease progression patterns, stroke risk and rate control issues). Patient had her questions answered to her satisfaction.      Patient has a [id number] score of 3 [Age over 65 (1 point), Female gender (1 point), and Hypertension (1 point)]  Longterm anticoagulation is: recommended  Current anticoagulation: Apixaban  Bleeding issues reported: no  Renal function: Normal renal function  Thyroid function: Normal    Patient diagnosed with atrial fibrillation many years ago and has been on propafenone for more than 10 years. Denies any recent symptomatic episodes. No reported side effects on propafenone. Not on concomitant AV blocker. In sinus rhythm today.     We arranged for an event monitor (2 weeks) which did not show any evidence of AF or significant bradycardia issues.     Discussed with patient switching to alternate anti-arrhythmic drug to simplify dosing and to reduce overall dose. We also discussed fact

## 2025-06-02 ENCOUNTER — TELEPHONE (OUTPATIENT)
Dept: CARDIOLOGY CLINIC | Age: 75
End: 2025-06-02

## 2025-06-02 NOTE — TELEPHONE ENCOUNTER
Please see separate encounter where cardiac clearance has been provided. Letter faxed to provided fax number.

## 2025-06-02 NOTE — TELEPHONE ENCOUNTER
CARDIAC CLEARANCE REQUEST    What type of procedure are you having:  Colonoscopy    Are you taking any blood thinners:  yes    Type on anesthesia:  MAC    When is your procedure scheduled for:  06/24    What physician is performing your procedure:  Dr. Cartwright    Phone Number:  [phone] option 4    Fax number to send the letter:   [phone]

## 2025-06-23 DIAGNOSIS — I48.0 PAROXYSMAL ATRIAL FIBRILLATION (HCC): ICD-10-CM

## 2025-06-23 RX ORDER — PROPAFENONE HYDROCHLORIDE 150 MG/1
150 TABLET, COATED ORAL EVERY 8 HOURS
Qty: 270 TABLET | Refills: 1 | Status: SHIPPED | OUTPATIENT
Start: 2025-06-23

## 2025-06-23 NOTE — TELEPHONE ENCOUNTER
Last Office Visit: 5/30/2025 Provider: TIKI  **Is provider OOT? Yes    Next Office Visit: 11/21/2025 Provider: MANOLO    **Has patient already had 30 day supply? No    Lab orders needed? no   Encounter provider correct? Yes If not, change provider  Script changes since last refill? no    LAST LABS:   CMP:  Lab Results   Component Value Date     03/22/2025    K 3.6 03/22/2025     03/22/2025    CO2 27 03/22/2025    BUN 15 03/22/2025    CREATININE 0.8 03/22/2025    GLUCOSE 123 (H) 03/22/2025    CALCIUM 9.6 03/22/2025    BILITOT 1.4 (H) 03/22/2025    ALKPHOS 108 03/22/2025    AST 50 (H) 03/22/2025    ALT 47 (H) 03/22/2025    LABGLOM 78 03/22/2025    GFRAA >60 09/01/2022    AGRATIO 1.5 03/22/2025    GLOB 3.1 07/22/2020         EKG:  Encounter Date: 03/22/25   EKG 12 Lead   Result Value    Ventricular Rate 60    Atrial Rate 60    P-R Interval 196    QRS Duration 92    Q-T Interval 384    QTc Calculation (Bazett) 384    P Axis 58    R Axis -37    T Axis 138    Diagnosis      Normal sinus rhythmLeft axis deviationMinimal voltage criteria for LVH, may be normal variant ( [place] product )Nonspecific T wave abnormalityAbnormal ECGWhen compared with ECG of 22-MAR-2025 22:14,QRS axis Shifted leftNonspecific T wave abnormality, improved in Inferior leadsQT has lengthenedConfirmed by PAULINA WAITE, DANIELITO ([id number]) on 3/23/2025 1:21:56 PM       **Care Everywhere? no

## 2025-08-25 ENCOUNTER — OFFICE VISIT (OUTPATIENT)
Dept: CARDIOLOGY CLINIC | Age: 75
End: 2025-08-25
Payer: MEDICARE

## 2025-08-25 VITALS
HEIGHT: 61 IN | BODY MASS INDEX: 29.26 KG/M2 | SYSTOLIC BLOOD PRESSURE: 120 MMHG | WEIGHT: 154.98 LBS | HEART RATE: 64 BPM | DIASTOLIC BLOOD PRESSURE: 80 MMHG | OXYGEN SATURATION: 98 %

## 2025-08-25 DIAGNOSIS — E78.2 MIXED HYPERLIPIDEMIA: ICD-10-CM

## 2025-08-25 DIAGNOSIS — I10 PRIMARY HYPERTENSION: ICD-10-CM

## 2025-08-25 DIAGNOSIS — E03.9 ACQUIRED HYPOTHYROIDISM: ICD-10-CM

## 2025-08-25 DIAGNOSIS — I25.10 MILD CAD: ICD-10-CM

## 2025-08-25 DIAGNOSIS — I48.0 PAROXYSMAL ATRIAL FIBRILLATION (HCC): Primary | ICD-10-CM

## 2025-08-25 PROCEDURE — 93000 ELECTROCARDIOGRAM COMPLETE: CPT

## 2025-08-25 PROCEDURE — G2211 COMPLEX E/M VISIT ADD ON: HCPCS

## 2025-08-25 PROCEDURE — G8427 DOCREV CUR MEDS BY ELIG CLIN: HCPCS

## 2025-08-25 PROCEDURE — 3074F SYST BP LT 130 MM HG: CPT

## 2025-08-25 PROCEDURE — G8400 PT W/DXA NO RESULTS DOC: HCPCS

## 2025-08-25 PROCEDURE — 3017F COLORECTAL CA SCREEN DOC REV: CPT

## 2025-08-25 PROCEDURE — G8417 CALC BMI ABV UP PARAM F/U: HCPCS

## 2025-08-25 PROCEDURE — 3079F DIAST BP 80-89 MM HG: CPT

## 2025-08-25 PROCEDURE — 1090F PRES/ABSN URINE INCON ASSESS: CPT

## 2025-08-25 PROCEDURE — 1036F TOBACCO NON-USER: CPT

## 2025-08-25 PROCEDURE — 1123F ACP DISCUSS/DSCN MKR DOCD: CPT

## 2025-08-25 PROCEDURE — 1159F MED LIST DOCD IN RCRD: CPT

## 2025-08-25 PROCEDURE — 99214 OFFICE O/P EST MOD 30 MIN: CPT

## (undated) DEVICE — STERILE POLYISOPRENE POWDER-FREE SURGICAL GLOVES: Brand: PROTEXIS

## (undated) DEVICE — PENCIL SMK EVAC ALL IN 1 DSGN ENH VISIBILITY IMPROVED AIR

## (undated) DEVICE — GLOVE SURG SZ 8 L11.77IN FNGR THK9.8MIL STRW LTX POLYMER

## (undated) DEVICE — HOOD, PEEL-AWAY: Brand: FLYTE

## (undated) DEVICE — SYRINGE MED 30ML STD CLR PLAS LUERLOCK TIP N CTRL DISP

## (undated) DEVICE — SUTURE VCRL + SZ 1 L18IN ABSRB VLT L36MM CT-1 1/2 CIR VCP741D

## (undated) DEVICE — NEEDLE SPNL 20GA L3.5IN YEL HUB S STL REG WALL FIT STYL W/

## (undated) DEVICE — GAUZE,SPONGE,4"X4",8PLY,STRL,LF,10/TRAY: Brand: MEDLINE

## (undated) DEVICE — SUTURE MCRYL SZ 3-0 L27IN ABSRB UD L19MM PS-2 3/8 CIR PRIM Y427H

## (undated) DEVICE — SUTURE VCRL + SZ 0 L18IN ABSRB UD L36MM CT-1 1/2 CIR VCP840D

## (undated) DEVICE — GOWN,REINF,POLY,AURORA,XLNG/XXL,STRL: Brand: MEDLINE

## (undated) DEVICE — HOOD: Brand: FLYTE

## (undated) DEVICE — HANDPIECE SET WITH HIGH FLOW TIP AND SUCTION TUBE: Brand: INTERPULSE

## (undated) DEVICE — SUTURE ETHBND EXCEL SZ 5 L30IN NONABSORBABLE GRN L40MM V-37 MB66G

## (undated) DEVICE — DUAL CUT SAGITTAL BLADE

## (undated) DEVICE — BIPOLAR SEALER 23-112-1 AQM 6.0: Brand: AQUAMANTYS ®

## (undated) DEVICE — 6619 2 PTNT ISO SYS INCISE AREA&LT;(&GT;&&LT;)&GT;P: Brand: STERI-DRAPE™ IOBAN™ 2

## (undated) DEVICE — DRAPE,U/ SHT,SPLIT,PLAS,STERIL: Brand: MEDLINE

## (undated) DEVICE — PACK PROCEDURE SURG HIP PIN TROCHANTERIC FEM NAIL CDS

## (undated) DEVICE — ELECTRODE BLDE L6.5IN CAUT EXT DISP

## (undated) DEVICE — OPTIFOAM GENTLE SA, POSTOP, 4X10: Brand: MEDLINE

## (undated) DEVICE — SUTURE VCRL + SZ 2-0 L18IN ABSRB UD CT1 L36MM 1/2 CIR VCP839D